# Patient Record
Sex: FEMALE | Race: BLACK OR AFRICAN AMERICAN | NOT HISPANIC OR LATINO | Employment: FULL TIME | URBAN - METROPOLITAN AREA
[De-identification: names, ages, dates, MRNs, and addresses within clinical notes are randomized per-mention and may not be internally consistent; named-entity substitution may affect disease eponyms.]

---

## 2020-12-21 ENCOUNTER — APPOINTMENT (OUTPATIENT)
Dept: GENERAL RADIOLOGY | Facility: CLINIC | Age: 45
DRG: 982 | End: 2020-12-21
Attending: EMERGENCY MEDICINE
Payer: COMMERCIAL

## 2020-12-21 ENCOUNTER — APPOINTMENT (OUTPATIENT)
Dept: GENERAL RADIOLOGY | Facility: CLINIC | Age: 45
DRG: 982 | End: 2020-12-21
Payer: COMMERCIAL

## 2020-12-21 ENCOUNTER — COMMUNICATION - HEALTHEAST (OUTPATIENT)
Dept: SCHEDULING | Facility: CLINIC | Age: 45
End: 2020-12-21

## 2020-12-21 ENCOUNTER — APPOINTMENT (OUTPATIENT)
Dept: CARDIOLOGY | Facility: CLINIC | Age: 45
DRG: 982 | End: 2020-12-21
Payer: COMMERCIAL

## 2020-12-21 ENCOUNTER — ANCILLARY PROCEDURE (OUTPATIENT)
Dept: ULTRASOUND IMAGING | Facility: CLINIC | Age: 45
End: 2020-12-21
Attending: EMERGENCY MEDICINE
Payer: COMMERCIAL

## 2020-12-21 ENCOUNTER — HOSPITAL ENCOUNTER (INPATIENT)
Facility: CLINIC | Age: 45
LOS: 14 days | Discharge: HOME HEALTH | DRG: 982 | End: 2021-01-04
Attending: EMERGENCY MEDICINE | Admitting: HOSPITALIST
Payer: COMMERCIAL

## 2020-12-21 DIAGNOSIS — I82.4Y2 ACUTE DEEP VEIN THROMBOSIS (DVT) OF PROXIMAL VEIN OF LEFT LOWER EXTREMITY (H): ICD-10-CM

## 2020-12-21 DIAGNOSIS — I26.99 PULMONARY EMBOLISM, BILATERAL (H): ICD-10-CM

## 2020-12-21 DIAGNOSIS — Z20.828 CONTACT WITH AND (SUSPECTED) EXPOSURE TO OTHER VIRAL COMMUNICABLE DISEASES: ICD-10-CM

## 2020-12-21 DIAGNOSIS — S82.402A TIBIA/FIBULA FRACTURE, LEFT, CLOSED, INITIAL ENCOUNTER: ICD-10-CM

## 2020-12-21 DIAGNOSIS — S82.252A CLOSED DISPLACED COMMINUTED FRACTURE OF SHAFT OF LEFT TIBIA, INITIAL ENCOUNTER: ICD-10-CM
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
## 2020-12-21 DIAGNOSIS — S82.202A TIBIA/FIBULA FRACTURE, LEFT, CLOSED, INITIAL ENCOUNTER: ICD-10-CM

## 2020-12-21 DIAGNOSIS — S82.452A CLOSED DISPLACED COMMINUTED FRACTURE OF SHAFT OF LEFT FIBULA, INITIAL ENCOUNTER: ICD-10-CM

## 2020-12-21 LAB
ABO + RH BLD: NORMAL
ABO + RH BLD: NORMAL
ALBUMIN SERPL-MCNC: 3.4 G/DL (ref 3.4–5)
ALP SERPL-CCNC: 63 U/L (ref 40–150)
ALT SERPL W P-5'-P-CCNC: 34 U/L (ref 0–50)
ANION GAP SERPL CALCULATED.3IONS-SCNC: 6 MMOL/L (ref 3–14)
APTT PPP: 112 SEC (ref 22–37)
APTT PPP: 115 SEC (ref 22–37)
AST SERPL W P-5'-P-CCNC: 58 U/L (ref 0–45)
BASOPHILS # BLD AUTO: 0.1 10E9/L (ref 0–0.2)
BASOPHILS NFR BLD AUTO: 0.7 %
BILIRUB SERPL-MCNC: 0.5 MG/DL (ref 0.2–1.3)
BLD GP AB SCN SERPL QL: NORMAL
BLOOD BANK CMNT PATIENT-IMP: NORMAL
BUN SERPL-MCNC: 11 MG/DL (ref 7–30)
CALCIUM SERPL-MCNC: 8.7 MG/DL (ref 8.5–10.1)
CHLORIDE SERPL-SCNC: 110 MMOL/L (ref 94–109)
CO2 SERPL-SCNC: 24 MMOL/L (ref 20–32)
CREAT SERPL-MCNC: 0.92 MG/DL (ref 0.52–1.04)
DIFFERENTIAL METHOD BLD: ABNORMAL
EOSINOPHIL # BLD AUTO: 0.2 10E9/L (ref 0–0.7)
EOSINOPHIL NFR BLD AUTO: 2.7 %
ERYTHROCYTE [DISTWIDTH] IN BLOOD BY AUTOMATED COUNT: 13.9 % (ref 10–15)
ERYTHROCYTE [DISTWIDTH] IN BLOOD BY AUTOMATED COUNT: 14.1 % (ref 10–15)
FLUAV+FLUBV RNA SPEC QL NAA+PROBE: NEGATIVE
FLUAV+FLUBV RNA SPEC QL NAA+PROBE: NEGATIVE
GFR SERPL CREATININE-BSD FRML MDRD: 74 ML/MIN/{1.73_M2}
GLUCOSE BLDC GLUCOMTR-MCNC: 93 MG/DL (ref 70–99)
GLUCOSE SERPL-MCNC: 99 MG/DL (ref 70–99)
HCG SERPL QL: NEGATIVE
HCT VFR BLD AUTO: 36.4 % (ref 35–47)
HCT VFR BLD AUTO: 38.3 % (ref 35–47)
HGB BLD-MCNC: 11.9 G/DL (ref 11.7–15.7)
HGB BLD-MCNC: 12.6 G/DL (ref 11.7–15.7)
IMM GRANULOCYTES # BLD: 0.1 10E9/L (ref 0–0.4)
IMM GRANULOCYTES NFR BLD: 1 %
INR PPP: 1.2 (ref 0.86–1.14)
INTERPRETATION ECG - MUSE: NORMAL
LABORATORY COMMENT REPORT: NORMAL
LACTATE BLD-SCNC: 1 MMOL/L (ref 0.7–2)
LIPASE SERPL-CCNC: 116 U/L (ref 73–393)
LYMPHOCYTES # BLD AUTO: 1.8 10E9/L (ref 0.8–5.3)
LYMPHOCYTES NFR BLD AUTO: 25.2 %
MCH RBC QN AUTO: 27.4 PG (ref 26.5–33)
MCH RBC QN AUTO: 27.7 PG (ref 26.5–33)
MCHC RBC AUTO-ENTMCNC: 32.7 G/DL (ref 31.5–36.5)
MCHC RBC AUTO-ENTMCNC: 32.9 G/DL (ref 31.5–36.5)
MCV RBC AUTO: 84 FL (ref 78–100)
MCV RBC AUTO: 84 FL (ref 78–100)
MONOCYTES # BLD AUTO: 0.7 10E9/L (ref 0–1.3)
MONOCYTES NFR BLD AUTO: 10.3 %
NEUTROPHILS # BLD AUTO: 4.2 10E9/L (ref 1.6–8.3)
NEUTROPHILS NFR BLD AUTO: 60.1 %
NRBC # BLD AUTO: 0 10*3/UL
NRBC BLD AUTO-RTO: 0 /100
NT-PROBNP SERPL-MCNC: 754 PG/ML (ref 0–450)
PLATELET # BLD AUTO: 138 10E9/L (ref 150–450)
PLATELET # BLD AUTO: 144 10E9/L (ref 150–450)
POTASSIUM SERPL-SCNC: 3.5 MMOL/L (ref 3.4–5.3)
PROT SERPL-MCNC: 7.5 G/DL (ref 6.8–8.8)
RBC # BLD AUTO: 4.35 10E12/L (ref 3.8–5.2)
RBC # BLD AUTO: 4.55 10E12/L (ref 3.8–5.2)
RSV RNA SPEC QL NAA+PROBE: NEGATIVE
SARS-COV-2 RNA SPEC QL NAA+PROBE: NEGATIVE
SODIUM SERPL-SCNC: 140 MMOL/L (ref 133–144)
SPECIMEN EXP DATE BLD: NORMAL
SPECIMEN SOURCE: NORMAL
TROPONIN I SERPL-MCNC: 0.27 UG/L (ref 0–0.04)
UFH PPP CHRO-ACNC: 0.77 IU/ML
UFH PPP CHRO-ACNC: 0.98 IU/ML
WBC # BLD AUTO: 6.2 10E9/L (ref 4–11)
WBC # BLD AUTO: 7 10E9/L (ref 4–11)

## 2020-12-21 PROCEDURE — 93308 TTE F-UP OR LMTD: CPT | Performed by: EMERGENCY MEDICINE

## 2020-12-21 PROCEDURE — 86900 BLOOD TYPING SEROLOGIC ABO: CPT | Performed by: EMERGENCY MEDICINE

## 2020-12-21 PROCEDURE — 250N000013 HC RX MED GY IP 250 OP 250 PS 637: Performed by: STUDENT IN AN ORGANIZED HEALTH CARE EDUCATION/TRAINING PROGRAM

## 2020-12-21 PROCEDURE — 84484 ASSAY OF TROPONIN QUANT: CPT | Performed by: EMERGENCY MEDICINE

## 2020-12-21 PROCEDURE — 80053 COMPREHEN METABOLIC PANEL: CPT | Performed by: EMERGENCY MEDICINE

## 2020-12-21 PROCEDURE — 120N000003 HC R&B IMCU UMMC

## 2020-12-21 PROCEDURE — 85610 PROTHROMBIN TIME: CPT | Performed by: EMERGENCY MEDICINE

## 2020-12-21 PROCEDURE — 96365 THER/PROPH/DIAG IV INF INIT: CPT | Performed by: EMERGENCY MEDICINE

## 2020-12-21 PROCEDURE — 250N000011 HC RX IP 250 OP 636: Performed by: EMERGENCY MEDICINE

## 2020-12-21 PROCEDURE — 93308 TTE F-UP OR LMTD: CPT | Mod: 26 | Performed by: EMERGENCY MEDICINE

## 2020-12-21 PROCEDURE — 85730 THROMBOPLASTIN TIME PARTIAL: CPT | Performed by: EMERGENCY MEDICINE

## 2020-12-21 PROCEDURE — 255N000002 HC RX 255 OP 636: Performed by: INTERNAL MEDICINE

## 2020-12-21 PROCEDURE — 84703 CHORIONIC GONADOTROPIN ASSAY: CPT | Performed by: EMERGENCY MEDICINE

## 2020-12-21 PROCEDURE — G0463 HOSPITAL OUTPT CLINIC VISIT: HCPCS

## 2020-12-21 PROCEDURE — 999N000208 ECHOCARDIOGRAM COMPLETE

## 2020-12-21 PROCEDURE — 999N000065 XR TIBIA & FIBULA PORT LT 2 VW: Mod: LT

## 2020-12-21 PROCEDURE — 73590 X-RAY EXAM OF LOWER LEG: CPT | Mod: LT

## 2020-12-21 PROCEDURE — 250N000011 HC RX IP 250 OP 636: Performed by: STUDENT IN AN ORGANIZED HEALTH CARE EDUCATION/TRAINING PROGRAM

## 2020-12-21 PROCEDURE — 83605 ASSAY OF LACTIC ACID: CPT | Performed by: EMERGENCY MEDICINE

## 2020-12-21 PROCEDURE — 36415 COLL VENOUS BLD VENIPUNCTURE: CPT | Performed by: HOSPITALIST

## 2020-12-21 PROCEDURE — 85730 THROMBOPLASTIN TIME PARTIAL: CPT | Performed by: STUDENT IN AN ORGANIZED HEALTH CARE EDUCATION/TRAINING PROGRAM

## 2020-12-21 PROCEDURE — 73590 X-RAY EXAM OF LOWER LEG: CPT | Mod: 26 | Performed by: RADIOLOGY

## 2020-12-21 PROCEDURE — 73610 X-RAY EXAM OF ANKLE: CPT | Mod: 26 | Performed by: RADIOLOGY

## 2020-12-21 PROCEDURE — 99233 SBSQ HOSP IP/OBS HIGH 50: CPT | Mod: GC | Performed by: STUDENT IN AN ORGANIZED HEALTH CARE EDUCATION/TRAINING PROGRAM

## 2020-12-21 PROCEDURE — 87636 SARSCOV2 & INF A&B AMP PRB: CPT | Performed by: EMERGENCY MEDICINE

## 2020-12-21 PROCEDURE — 85520 HEPARIN ASSAY: CPT | Performed by: HOSPITALIST

## 2020-12-21 PROCEDURE — 96366 THER/PROPH/DIAG IV INF ADDON: CPT | Performed by: EMERGENCY MEDICINE

## 2020-12-21 PROCEDURE — 99285 EMERGENCY DEPT VISIT HI MDM: CPT | Mod: 25 | Performed by: EMERGENCY MEDICINE

## 2020-12-21 PROCEDURE — 85025 COMPLETE CBC W/AUTO DIFF WBC: CPT | Performed by: EMERGENCY MEDICINE

## 2020-12-21 PROCEDURE — 85027 COMPLETE CBC AUTOMATED: CPT | Performed by: STUDENT IN AN ORGANIZED HEALTH CARE EDUCATION/TRAINING PROGRAM

## 2020-12-21 PROCEDURE — 86850 RBC ANTIBODY SCREEN: CPT | Performed by: EMERGENCY MEDICINE

## 2020-12-21 PROCEDURE — 99291 CRITICAL CARE FIRST HOUR: CPT | Mod: 25 | Performed by: EMERGENCY MEDICINE

## 2020-12-21 PROCEDURE — 93010 ELECTROCARDIOGRAM REPORT: CPT | Performed by: EMERGENCY MEDICINE

## 2020-12-21 PROCEDURE — 93005 ELECTROCARDIOGRAM TRACING: CPT | Performed by: EMERGENCY MEDICINE

## 2020-12-21 PROCEDURE — 73560 X-RAY EXAM OF KNEE 1 OR 2: CPT | Mod: 26 | Performed by: RADIOLOGY

## 2020-12-21 PROCEDURE — 83880 ASSAY OF NATRIURETIC PEPTIDE: CPT | Performed by: EMERGENCY MEDICINE

## 2020-12-21 PROCEDURE — 83690 ASSAY OF LIPASE: CPT | Performed by: EMERGENCY MEDICINE

## 2020-12-21 PROCEDURE — 86901 BLOOD TYPING SEROLOGIC RH(D): CPT | Performed by: EMERGENCY MEDICINE

## 2020-12-21 PROCEDURE — 85520 HEPARIN ASSAY: CPT | Performed by: NURSE PRACTITIONER

## 2020-12-21 PROCEDURE — 93306 TTE W/DOPPLER COMPLETE: CPT | Mod: 26 | Performed by: INTERNAL MEDICINE

## 2020-12-21 PROCEDURE — 36415 COLL VENOUS BLD VENIPUNCTURE: CPT | Performed by: NURSE PRACTITIONER

## 2020-12-21 PROCEDURE — C9803 HOPD COVID-19 SPEC COLLECT: HCPCS | Performed by: EMERGENCY MEDICINE

## 2020-12-21 PROCEDURE — 999N001017 HC STATISTIC GLUCOSE BY METER IP

## 2020-12-21 PROCEDURE — 36415 COLL VENOUS BLD VENIPUNCTURE: CPT | Performed by: STUDENT IN AN ORGANIZED HEALTH CARE EDUCATION/TRAINING PROGRAM

## 2020-12-21 PROCEDURE — 73610 X-RAY EXAM OF ANKLE: CPT | Mod: LT

## 2020-12-21 PROCEDURE — 73560 X-RAY EXAM OF KNEE 1 OR 2: CPT | Mod: LT

## 2020-12-21 RX ORDER — NALOXONE HYDROCHLORIDE 0.4 MG/ML
0.2 INJECTION, SOLUTION INTRAMUSCULAR; INTRAVENOUS; SUBCUTANEOUS
Status: DISCONTINUED | OUTPATIENT
Start: 2020-12-21 | End: 2021-01-04 | Stop reason: HOSPADM

## 2020-12-21 RX ORDER — PHENOL 1.4 %
600 AEROSOL, SPRAY (ML) MUCOUS MEMBRANE DAILY
COMMUNITY

## 2020-12-21 RX ORDER — OXYCODONE HYDROCHLORIDE 5 MG/1
5 TABLET ORAL EVERY 6 HOURS PRN
Status: DISCONTINUED | OUTPATIENT
Start: 2020-12-21 | End: 2020-12-31

## 2020-12-21 RX ORDER — ACETAMINOPHEN 325 MG/1
975 TABLET ORAL 3 TIMES DAILY
Status: DISCONTINUED | OUTPATIENT
Start: 2020-12-21 | End: 2021-01-04 | Stop reason: HOSPADM

## 2020-12-21 RX ORDER — NALOXONE HYDROCHLORIDE 0.4 MG/ML
0.4 INJECTION, SOLUTION INTRAMUSCULAR; INTRAVENOUS; SUBCUTANEOUS
Status: DISCONTINUED | OUTPATIENT
Start: 2020-12-21 | End: 2021-01-04 | Stop reason: HOSPADM

## 2020-12-21 RX ORDER — ASPIRIN 81 MG
1 TABLET, DELAYED RELEASE (ENTERIC COATED) ORAL DAILY
COMMUNITY

## 2020-12-21 RX ORDER — ACETAMINOPHEN 500 MG
500 TABLET ORAL EVERY 6 HOURS PRN
Status: DISCONTINUED | OUTPATIENT
Start: 2020-12-21 | End: 2020-12-21

## 2020-12-21 RX ORDER — CETIRIZINE HYDROCHLORIDE 10 MG/1
10 TABLET ORAL DAILY
COMMUNITY

## 2020-12-21 RX ORDER — HYDROMORPHONE HYDROCHLORIDE 1 MG/ML
0.3 INJECTION, SOLUTION INTRAMUSCULAR; INTRAVENOUS; SUBCUTANEOUS
Status: DISCONTINUED | OUTPATIENT
Start: 2020-12-21 | End: 2021-01-03

## 2020-12-21 RX ORDER — HEPARIN SODIUM 10000 [USP'U]/100ML
0-5000 INJECTION, SOLUTION INTRAVENOUS CONTINUOUS
Status: DISCONTINUED | OUTPATIENT
Start: 2020-12-21 | End: 2020-12-21

## 2020-12-21 RX ORDER — HEPARIN SODIUM 10000 [USP'U]/100ML
0-5000 INJECTION, SOLUTION INTRAVENOUS CONTINUOUS
Status: DISCONTINUED | OUTPATIENT
Start: 2020-12-21 | End: 2020-12-23

## 2020-12-21 RX ADMIN — HYDROMORPHONE HYDROCHLORIDE 0.3 MG: 1 INJECTION, SOLUTION INTRAMUSCULAR; INTRAVENOUS; SUBCUTANEOUS at 16:15

## 2020-12-21 RX ADMIN — HYDROMORPHONE HYDROCHLORIDE 0.3 MG: 1 INJECTION, SOLUTION INTRAMUSCULAR; INTRAVENOUS; SUBCUTANEOUS at 10:22

## 2020-12-21 RX ADMIN — HUMAN ALBUMIN MICROSPHERES AND PERFLUTREN 6 ML: 10; .22 INJECTION, SOLUTION INTRAVENOUS at 10:00

## 2020-12-21 RX ADMIN — ACETAMINOPHEN 500 MG: 500 TABLET, FILM COATED ORAL at 08:25

## 2020-12-21 RX ADMIN — HEPARIN SODIUM 1200 UNITS/HR: 10000 INJECTION, SOLUTION INTRAVENOUS at 03:08

## 2020-12-21 RX ADMIN — OXYCODONE HYDROCHLORIDE 5 MG: 5 TABLET ORAL at 09:58

## 2020-12-21 RX ADMIN — Medication 1 MG: at 21:51

## 2020-12-21 RX ADMIN — HEPARIN SODIUM 1200 UNITS/HR: 10000 INJECTION, SOLUTION INTRAVENOUS at 10:22

## 2020-12-21 ASSESSMENT — MIFFLIN-ST. JEOR
SCORE: 1918.63
SCORE: 1811.62

## 2020-12-21 ASSESSMENT — ACTIVITIES OF DAILY LIVING (ADL)
ADLS_ACUITY_SCORE: 18
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
## 2020-12-21 NOTE — PLAN OF CARE
Transferred to: 6B at 1700.  Belongings: purse, cellphone/, SIM card, wallet, clothing, shoes, and crutches.  Bernal removed? NA  Central line removed? NA  Chart and medications sent with patient Yes  Family notified: No: Pt on phone with family right now, will notify them herself.      A&O x4. SR/ST 's. VSS on RA. Regular diet, tolerating well. Voids per bedpan. LLE cast changed out this am by Orthosurgery - multiple blisters/wounds noted under cast. Scattered bruising throughout. R PIV x 2 - heparin gtt at 1700 (next check at 1915). PRN dilaudid and oxycodone given.
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
## 2020-12-21 NOTE — LETTER
Transition Communication Hand-off for Care Transitions to Next Level of Care Provider    Name: Moni Molina  : 1975  MRN #: [id number]  Primary Care Provider: Physician No Ref-Primary     Primary Clinic: Dr. Vashti Demarco MD is a Family Medicine Specialist in [place], [place] and has over 26 years of experience in the medical field. ... [facility].   Reason for Hospitalization:  Pulmonary embolism, bilateral (H) [I26.99]  Tibia/fibula fracture, left, closed, initial encounter [S82.202A, S82.402A]  Acute deep vein thrombosis (DVT) of proximal vein of left lower extremity (H) [I82.4Y2]  Admit Date/Time: 2020  2:28 AM  Discharge Date: 2021  Payor Source: Payor: TPACK / Plan: [employer] [place] / Product Type: HMO /   Discharge Plan: Home with  Home care.         Discharge Needs Assessment:  Needs      Most Recent Value   Equipment Currently Used at Home  grab bar, tub/shower, shower chair        Future Appointments   Date Time Provider Department Center   2021  2:30 PM Tasia Velez PT [facility] O     Referrals     Future Labs/Procedures    Home care nursing referral     Comments:    Please fax discharge orders to [facility] [place]    Ph:  [phone]    Fax: [phone]    Skilled home care RN for initial home safety evaluation and 1-3 times a week to evaluate medication management, nutrition and hydration evaluation, endurance evaluation, and general status evaluation after discharge from the acute care hospital setting.    Skilled home care Physical Therapist and Occupational Therapist to evaluate and treat in home setting per recommendations of the inpatient Rehabilitation Consult Team including recommendations for equipment that will help patient in her home setting.          Joanne Atwood, RN
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
## 2020-12-21 NOTE — PROGRESS NOTES
Transfer  Transferred from: 4A    Via: bed  Reason for transfer: Pt appropriate for 6B- improving/stable patient condition  Family: Aware of transfer  Belongings: Received with pt  Chart: Received with pt  Medications: Meds received from old unit with pt  Code Status verified on armband: yes  2 RN Skin Assessment Completed By: Miller COSTA & Kristine CASTANON  Med rec completed: yes  Bed surface reassessed with algorithm and charted: yes  New bed surface ordered: no    Report received from: Dayna BELTRAN RN  Pt status: Stable at this time.

## 2020-12-21 NOTE — LETTER
[facility] UNIT [address]B [address] [address]  [phone]    2020    Re: Moni Molina  [address] [place] [address] E  [place] [address]  [phone] (home)     : 1975      To Whom It May Concern:      Moni Molina was hospitalized on 2020 and due to medical illness and injury. She will need surgery next week, and is unable to work until deemed stable after the surgery. Further work restriction will be determined by surgeon after next week's surgery.        Sincerely,        Monik Adam MD

## 2020-12-21 NOTE — ED NOTES
[facility]    ED Nurse to Floor Handoff     Moni Molina is a 45 year old female who speaks English and lives with a spouse,  in a home  They arrived in the ED by ambulance from emergency room    ED Chief Complaint: Leg Injury    ED Dx;   Final diagnoses:   Pulmonary embolism, bilateral (H)   Tibia/fibula fracture, left, closed, initial encounter   Acute deep vein thrombosis (DVT) of proximal vein of left lower extremity (H)         Needed?: No    Allergies: No Known Allergies.  Past Medical Hx: History reviewed. No pertinent past medical history.   Baseline Mental status: WDL  Current Mental Status changes: at basesline    Infection present or suspected this encounter: no  Sepsis suspected: No  Isolation type: Special Precautions-COVID-19  Patient tested for COVID 19 prior to admission: YES     Activity level - Baseline/Home:  Independent  Activity Level - Current:   Stand with Assist    Bariatric equipment needed?: No    In the ED these meds were given:   Medications   heparin 25,000 units in 0.45% NaCl 250 mL ANTICOAGULANT  infusion (1,200 Units/hr Intravenous New Bag 12/21/20 0308)       Drips running?  Yes    Home pump  No    Current LDAs  Peripheral IV 12/21/20 Right Upper forearm (Active)   Number of days: 0       Peripheral IV 12/21/20 Right Lower forearm (Active)   Site Assessment WDL 12/21/20 0319   Line Status Infusing 12/21/20 0319   Phlebitis Scale 0-->no symptoms 12/21/20 0319   Number of days: 0       Labs results:   Labs Ordered and Resulted from Time of ED Arrival Up to the Time of Departure from the ED   CBC WITH PLATELETS DIFFERENTIAL - Abnormal; Notable for the following components:       Result Value    Platelet Count 138 (*)     All other components within normal limits   INR - Abnormal; Notable for the following components:    INR 1.20 (*)     All other components within normal limits   PARTIAL THROMBOPLASTIN TIME - Abnormal; Notable  "for the following components:     (*)     All other components within normal limits   COMPREHENSIVE METABOLIC PANEL - Abnormal; Notable for the following components:    Chloride 110 (*)     AST 58 (*)     All other components within normal limits   NT PROBNP INPATIENT - Abnormal; Notable for the following components:    N-Terminal Pro BNP Inpatient 754 (*)     All other components within normal limits   TROPONIN I - Abnormal; Notable for the following components:    Troponin I ES 0.274 (*)     All other components within normal limits   LIPASE   LACTIC ACID WHOLE BLOOD   INFLUENZA A/B & SARS-COV2 PCR MULTIPLEX   MEASURE WEIGHT   NOTIFY PHYSICIAN   NOTIFY PHYSICIAN   ABO/RH TYPE AND SCREEN       Imaging Studies:   Recent Results (from the past 24 hour(s))   POC US Echo Limited    Impression    Limited Bedside Cardiac Ultrasound, performed and interpreted by me.   Indication: Shortness of Breath.  Parasternal long axis, parasternal short axis and subcostal views were acquired.   Image quality poor    Findings:    Small pericardial effusion.  Ventricle sizes appear to be equal however poor windows overall.  Difficulty with assessing the IVC.    IMPRESSION: Small pericardial effusion.  Ventricle sizes appear to be equal however poor windows overall.  Difficulty with assessing the IVC.       Recent vital signs:   BP (!) 150/88   Pulse 97   Temp 98.8  F (37.1  C) (Oral)   Resp 22   Ht 1.702 m (5' 7\")   Wt 113.4 kg (250 lb)   SpO2 93%   Breastfeeding No   BMI 39.16 kg/m      [place] Coma Scale Score: 15 (12/21/20 0247)       Cardiac Rhythm: Normal Sinus  Pt needs tele? TBD upon floor admission  Skin/wound Issues: None    Code Status: to be discussed upon admission     Pain control: good    Nausea control: good    Abnormal labs/tests/findings requiring intervention: see epic- elevated trop    Family present during ED course? No   Family Comments/Social Situation comments: n/a    Tasks needing completion: " None    Dayna Olivera RN    [phone]
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
## 2020-12-21 NOTE — PLAN OF CARE
Admitted/transferred from: ED  Reason for admission/transfer: Admission   2 RN skin assessment: completed by Dayna Godwin RN  Result of skin assessment and interventions/actions: LLE splint  Height, weight, drug calc weight: Done  Patient belongings (see Flowsheet)  MDRO education added to care planN/A  ?
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
## 2020-12-21 NOTE — LETTER
[facility] [address] [address]  [phone]    2020    Re: Moni Molina  [address] [place] [address]  [phone] (home)     : 1975      To Whom It May Concern:      Moni Molina was hospitalized on 2020 due to medical illness and injury. She will need surgery next week, and is unable to return to work until deemed stable after the surgery. Further work restriction will be determined by surgeon after next weeks' surgery.        Sincerely,        Darrell Franklin MD

## 2020-12-22 ENCOUNTER — PREP FOR PROCEDURE (OUTPATIENT)
Dept: ORTHOPEDICS | Facility: CLINIC | Age: 45
End: 2020-12-22

## 2020-12-22 DIAGNOSIS — S82.202A TIBIA/FIBULA FRACTURE, LEFT, CLOSED, INITIAL ENCOUNTER: Primary | ICD-10-CM

## 2020-12-22 DIAGNOSIS — S82.402A TIBIA/FIBULA FRACTURE, LEFT, CLOSED, INITIAL ENCOUNTER: Primary | ICD-10-CM

## 2020-12-22 LAB
ALBUMIN SERPL-MCNC: 3.4 G/DL (ref 3.4–5)
ALP SERPL-CCNC: 62 U/L (ref 40–150)
ALT SERPL W P-5'-P-CCNC: 36 U/L (ref 0–50)
ANION GAP SERPL CALCULATED.3IONS-SCNC: 10 MMOL/L (ref 3–14)
AST SERPL W P-5'-P-CCNC: 46 U/L (ref 0–45)
B-HCG SERPL-ACNC: <1 IU/L (ref 0–5)
BASOPHILS # BLD AUTO: 0 10E9/L (ref 0–0.2)
BASOPHILS NFR BLD AUTO: 0.5 %
BILIRUB SERPL-MCNC: 0.7 MG/DL (ref 0.2–1.3)
BUN SERPL-MCNC: 13 MG/DL (ref 7–30)
CALCIUM SERPL-MCNC: 9.1 MG/DL (ref 8.5–10.1)
CHLORIDE SERPL-SCNC: 104 MMOL/L (ref 94–109)
CO2 SERPL-SCNC: 23 MMOL/L (ref 20–32)
CREAT SERPL-MCNC: 0.92 MG/DL (ref 0.52–1.04)
DIFFERENTIAL METHOD BLD: NORMAL
EOSINOPHIL # BLD AUTO: 0.3 10E9/L (ref 0–0.7)
EOSINOPHIL NFR BLD AUTO: 4 %
ERYTHROCYTE [DISTWIDTH] IN BLOOD BY AUTOMATED COUNT: 14.2 % (ref 10–15)
GFR SERPL CREATININE-BSD FRML MDRD: 74 ML/MIN/{1.73_M2}
GLUCOSE SERPL-MCNC: 73 MG/DL (ref 70–99)
HCT VFR BLD AUTO: 36.9 % (ref 35–47)
HGB BLD-MCNC: 11.8 G/DL (ref 11.7–15.7)
IMM GRANULOCYTES # BLD: 0.1 10E9/L (ref 0–0.4)
IMM GRANULOCYTES NFR BLD: 1.3 %
LYMPHOCYTES # BLD AUTO: 2.1 10E9/L (ref 0.8–5.3)
LYMPHOCYTES NFR BLD AUTO: 26.6 %
MCH RBC QN AUTO: 27.2 PG (ref 26.5–33)
MCHC RBC AUTO-ENTMCNC: 32 G/DL (ref 31.5–36.5)
MCV RBC AUTO: 85 FL (ref 78–100)
MONOCYTES # BLD AUTO: 0.7 10E9/L (ref 0–1.3)
MONOCYTES NFR BLD AUTO: 9.5 %
NEUTROPHILS # BLD AUTO: 4.5 10E9/L (ref 1.6–8.3)
NEUTROPHILS NFR BLD AUTO: 58.1 %
NRBC # BLD AUTO: 0 10*3/UL
NRBC BLD AUTO-RTO: 0 /100
PLATELET # BLD AUTO: 153 10E9/L (ref 150–450)
POTASSIUM SERPL-SCNC: 3.6 MMOL/L (ref 3.4–5.3)
PROT SERPL-MCNC: 7.6 G/DL (ref 6.8–8.8)
RBC # BLD AUTO: 4.34 10E12/L (ref 3.8–5.2)
SODIUM SERPL-SCNC: 137 MMOL/L (ref 133–144)
TROPONIN I SERPL-MCNC: 0.04 UG/L (ref 0–0.04)
UFH PPP CHRO-ACNC: 0.66 IU/ML
UFH PPP CHRO-ACNC: >1.1 IU/ML
UFH PPP CHRO-ACNC: NORMAL IU/ML
WBC # BLD AUTO: 7.8 10E9/L (ref 4–11)

## 2020-12-22 PROCEDURE — 250N000013 HC RX MED GY IP 250 OP 250 PS 637: Performed by: STUDENT IN AN ORGANIZED HEALTH CARE EDUCATION/TRAINING PROGRAM

## 2020-12-22 PROCEDURE — 84702 CHORIONIC GONADOTROPIN TEST: CPT | Performed by: STUDENT IN AN ORGANIZED HEALTH CARE EDUCATION/TRAINING PROGRAM

## 2020-12-22 PROCEDURE — 80053 COMPREHEN METABOLIC PANEL: CPT | Performed by: STUDENT IN AN ORGANIZED HEALTH CARE EDUCATION/TRAINING PROGRAM

## 2020-12-22 PROCEDURE — 250N000011 HC RX IP 250 OP 636: Performed by: STUDENT IN AN ORGANIZED HEALTH CARE EDUCATION/TRAINING PROGRAM

## 2020-12-22 PROCEDURE — 99232 SBSQ HOSP IP/OBS MODERATE 35: CPT | Performed by: PHYSICIAN ASSISTANT

## 2020-12-22 PROCEDURE — 84484 ASSAY OF TROPONIN QUANT: CPT | Performed by: STUDENT IN AN ORGANIZED HEALTH CARE EDUCATION/TRAINING PROGRAM

## 2020-12-22 PROCEDURE — 99223 1ST HOSP IP/OBS HIGH 75: CPT | Mod: GC | Performed by: INTERNAL MEDICINE

## 2020-12-22 PROCEDURE — 99233 SBSQ HOSP IP/OBS HIGH 50: CPT | Mod: GC | Performed by: INTERNAL MEDICINE

## 2020-12-22 PROCEDURE — 120N000003 HC R&B IMCU UMMC

## 2020-12-22 PROCEDURE — 85520 HEPARIN ASSAY: CPT | Performed by: NURSE PRACTITIONER

## 2020-12-22 PROCEDURE — 36415 COLL VENOUS BLD VENIPUNCTURE: CPT | Performed by: STUDENT IN AN ORGANIZED HEALTH CARE EDUCATION/TRAINING PROGRAM

## 2020-12-22 PROCEDURE — 85025 COMPLETE CBC W/AUTO DIFF WBC: CPT | Performed by: STUDENT IN AN ORGANIZED HEALTH CARE EDUCATION/TRAINING PROGRAM

## 2020-12-22 PROCEDURE — 85520 HEPARIN ASSAY: CPT | Performed by: STUDENT IN AN ORGANIZED HEALTH CARE EDUCATION/TRAINING PROGRAM

## 2020-12-22 PROCEDURE — 36415 COLL VENOUS BLD VENIPUNCTURE: CPT | Performed by: NURSE PRACTITIONER

## 2020-12-22 RX ORDER — AMOXICILLIN 250 MG
2 CAPSULE ORAL
Status: DISCONTINUED | OUTPATIENT
Start: 2020-12-22 | End: 2020-12-23

## 2020-12-22 RX ORDER — POLYETHYLENE GLYCOL 3350 17 G/17G
17 POWDER, FOR SOLUTION ORAL DAILY
Status: DISCONTINUED | OUTPATIENT
Start: 2020-12-22 | End: 2021-01-04 | Stop reason: HOSPADM

## 2020-12-22 RX ORDER — CARBOXYMETHYLCELLULOSE SODIUM 5 MG/ML
1 SOLUTION/ DROPS OPHTHALMIC 3 TIMES DAILY PRN
Status: DISCONTINUED | OUTPATIENT
Start: 2020-12-22 | End: 2021-01-04 | Stop reason: HOSPADM

## 2020-12-22 RX ORDER — CETIRIZINE HYDROCHLORIDE 10 MG/1
10 TABLET ORAL DAILY
Status: DISCONTINUED | OUTPATIENT
Start: 2020-12-23 | End: 2021-01-04 | Stop reason: HOSPADM

## 2020-12-22 RX ADMIN — POLYETHYLENE GLYCOL 3350 17 G: 17 POWDER, FOR SOLUTION ORAL at 14:05

## 2020-12-22 RX ADMIN — OXYCODONE HYDROCHLORIDE 5 MG: 5 TABLET ORAL at 00:37

## 2020-12-22 RX ADMIN — HYDROMORPHONE HYDROCHLORIDE 0.3 MG: 1 INJECTION, SOLUTION INTRAMUSCULAR; INTRAVENOUS; SUBCUTANEOUS at 17:50

## 2020-12-22 RX ADMIN — CETIRIZINE HYDROCHLORIDE 10 MG: 5 TABLET ORAL at 23:59

## 2020-12-22 RX ADMIN — HEPARIN SODIUM 1500 UNITS/HR: 10000 INJECTION, SOLUTION INTRAVENOUS at 04:16

## 2020-12-22 RX ADMIN — ACETAMINOPHEN 975 MG: 325 TABLET, FILM COATED ORAL at 14:04

## 2020-12-22 RX ADMIN — ACETAMINOPHEN 975 MG: 325 TABLET, FILM COATED ORAL at 22:18

## 2020-12-22 RX ADMIN — OXYCODONE HYDROCHLORIDE 5 MG: 5 TABLET ORAL at 18:57

## 2020-12-22 RX ADMIN — Medication 1 MG: at 22:18

## 2020-12-22 RX ADMIN — ACETAMINOPHEN 975 MG: 325 TABLET, FILM COATED ORAL at 08:29

## 2020-12-22 RX ADMIN — ACETAMINOPHEN 975 MG: 325 TABLET, FILM COATED ORAL at 00:36

## 2020-12-22 RX ADMIN — Medication 1 DROP: at 17:14

## 2020-12-22 ASSESSMENT — ACTIVITIES OF DAILY LIVING (ADL)
ADLS_ACUITY_SCORE: 18
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
## 2020-12-22 NOTE — PROGRESS NOTES
"SPIRITUAL HEALTH SERVICES  SPIRITUAL ASSESSMENT Progress Note  [facility] ([facility]) 6B       REFERRAL SOURCE: Self initiated  visit, Confucianist specific.     Pt Moni Molina identifies as Confucianist and is of [place] descent.     She mentioned to me that, \"I have lost both my parents, and I am the eldest of their children. Thank God that nothing (death) happened to me\".     Nasir has 4 children and takes on much responsibility (in  culture) being that she is the eldest with no parents and hopes to plan trips in the future to [facility] to fulfil some of these responsibilities. She was open with me about this and her feelings/emotions.     I offered Islamic incantations/prayer at bedside.     I also provided them with an Islamic prayer booklet and card with a Prophetic supplication. She received an English translation of the Holy Quran.      PLAN: I will follow up with Moni Molina for the duration of his stay.    Susy Dickinson  Lead Confucianist   Pager [phone]    [facility] remains available 24/7 for emergent requests/referrals, either by having the switchboard page the on-call  or by entering an ASAP/STAT consult in Epic (this will also page the on-call ).    "
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
## 2020-12-22 NOTE — PROVIDER NOTIFICATION
"\"MEHRAN Molina rm [id number] 6B [facility] 4. Pt requesting an order for her PTA refresh eye drops, states she takes them TID PRN in both eyes. Thanks, Davonte [phone]\"        Paged Olivia intern Dr. INO Valenzuela via [employer] at [phone].     "
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
## 2020-12-23 ENCOUNTER — APPOINTMENT (OUTPATIENT)
Dept: INTERVENTIONAL RADIOLOGY/VASCULAR | Facility: CLINIC | Age: 45
DRG: 982 | End: 2020-12-23
Attending: NURSE PRACTITIONER
Payer: COMMERCIAL

## 2020-12-23 LAB
ANION GAP SERPL CALCULATED.3IONS-SCNC: 5 MMOL/L (ref 3–14)
BUN SERPL-MCNC: 13 MG/DL (ref 7–30)
CALCIUM SERPL-MCNC: 9 MG/DL (ref 8.5–10.1)
CHLORIDE SERPL-SCNC: 105 MMOL/L (ref 94–109)
CO2 SERPL-SCNC: 25 MMOL/L (ref 20–32)
CREAT SERPL-MCNC: 0.91 MG/DL (ref 0.52–1.04)
ERYTHROCYTE [DISTWIDTH] IN BLOOD BY AUTOMATED COUNT: 14.1 % (ref 10–15)
GFR SERPL CREATININE-BSD FRML MDRD: 76 ML/MIN/{1.73_M2}
GLUCOSE SERPL-MCNC: 83 MG/DL (ref 70–99)
HCT VFR BLD AUTO: 33.9 % (ref 35–47)
HGB BLD-MCNC: 10.8 G/DL (ref 11.7–15.7)
LACTATE BLD-SCNC: 0.9 MMOL/L (ref 0.7–2)
MCH RBC QN AUTO: 27 PG (ref 26.5–33)
MCHC RBC AUTO-ENTMCNC: 31.9 G/DL (ref 31.5–36.5)
MCV RBC AUTO: 85 FL (ref 78–100)
PLATELET # BLD AUTO: 159 10E9/L (ref 150–450)
POTASSIUM SERPL-SCNC: 3.8 MMOL/L (ref 3.4–5.3)
RBC # BLD AUTO: 4 10E12/L (ref 3.8–5.2)
SODIUM SERPL-SCNC: 135 MMOL/L (ref 133–144)
UFH PPP CHRO-ACNC: 0.6 IU/ML
UFH PPP CHRO-ACNC: 0.68 IU/ML
UFH PPP CHRO-ACNC: 0.83 IU/ML
WBC # BLD AUTO: 7.5 10E9/L (ref 4–11)

## 2020-12-23 PROCEDURE — 99233 SBSQ HOSP IP/OBS HIGH 50: CPT | Mod: GC | Performed by: INTERNAL MEDICINE

## 2020-12-23 PROCEDURE — 36415 COLL VENOUS BLD VENIPUNCTURE: CPT | Performed by: NURSE PRACTITIONER

## 2020-12-23 PROCEDURE — 36415 COLL VENOUS BLD VENIPUNCTURE: CPT | Performed by: INTERNAL MEDICINE

## 2020-12-23 PROCEDURE — 250N000009 HC RX 250: Performed by: STUDENT IN AN ORGANIZED HEALTH CARE EDUCATION/TRAINING PROGRAM

## 2020-12-23 PROCEDURE — 36415 COLL VENOUS BLD VENIPUNCTURE: CPT | Performed by: STUDENT IN AN ORGANIZED HEALTH CARE EDUCATION/TRAINING PROGRAM

## 2020-12-23 PROCEDURE — 37191 INS ENDOVAS VENA CAVA FILTR: CPT

## 2020-12-23 PROCEDURE — C1887 CATHETER, GUIDING: HCPCS

## 2020-12-23 PROCEDURE — 250N000013 HC RX MED GY IP 250 OP 250 PS 637: Performed by: STUDENT IN AN ORGANIZED HEALTH CARE EDUCATION/TRAINING PROGRAM

## 2020-12-23 PROCEDURE — 272N000504 HC NEEDLE CR4

## 2020-12-23 PROCEDURE — 85027 COMPLETE CBC AUTOMATED: CPT | Performed by: STUDENT IN AN ORGANIZED HEALTH CARE EDUCATION/TRAINING PROGRAM

## 2020-12-23 PROCEDURE — 85520 HEPARIN ASSAY: CPT | Performed by: NURSE PRACTITIONER

## 2020-12-23 PROCEDURE — 99152 MOD SED SAME PHYS/QHP 5/>YRS: CPT

## 2020-12-23 PROCEDURE — 06H03DZ INSERTION OF INTRALUMINAL DEVICE INTO INFERIOR VENA CAVA, PERCUTANEOUS APPROACH: ICD-10-PCS | Performed by: RADIOLOGY

## 2020-12-23 PROCEDURE — 250N000011 HC RX IP 250 OP 636: Performed by: STUDENT IN AN ORGANIZED HEALTH CARE EDUCATION/TRAINING PROGRAM

## 2020-12-23 PROCEDURE — 99152 MOD SED SAME PHYS/QHP 5/>YRS: CPT | Mod: GC | Performed by: RADIOLOGY

## 2020-12-23 PROCEDURE — 83605 ASSAY OF LACTIC ACID: CPT | Performed by: NURSE PRACTITIONER

## 2020-12-23 PROCEDURE — 37191 INS ENDOVAS VENA CAVA FILTR: CPT | Mod: GC | Performed by: RADIOLOGY

## 2020-12-23 PROCEDURE — 80048 BASIC METABOLIC PNL TOTAL CA: CPT | Performed by: STUDENT IN AN ORGANIZED HEALTH CARE EDUCATION/TRAINING PROGRAM

## 2020-12-23 PROCEDURE — 85520 HEPARIN ASSAY: CPT | Performed by: STUDENT IN AN ORGANIZED HEALTH CARE EDUCATION/TRAINING PROGRAM

## 2020-12-23 PROCEDURE — 255N000002 HC RX 255 OP 636: Performed by: RADIOLOGY

## 2020-12-23 PROCEDURE — 85520 HEPARIN ASSAY: CPT | Performed by: INTERNAL MEDICINE

## 2020-12-23 PROCEDURE — 120N000003 HC R&B IMCU UMMC

## 2020-12-23 PROCEDURE — C1769 GUIDE WIRE: HCPCS

## 2020-12-23 PROCEDURE — C1880 VENA CAVA FILTER: HCPCS

## 2020-12-23 RX ORDER — NALOXONE HYDROCHLORIDE 0.4 MG/ML
0.4 INJECTION, SOLUTION INTRAMUSCULAR; INTRAVENOUS; SUBCUTANEOUS
Status: DISCONTINUED | OUTPATIENT
Start: 2020-12-23 | End: 2020-12-23

## 2020-12-23 RX ORDER — IODIXANOL 320 MG/ML
100 INJECTION, SOLUTION INTRAVASCULAR ONCE
Status: COMPLETED | OUTPATIENT
Start: 2020-12-23 | End: 2020-12-23

## 2020-12-23 RX ORDER — FLUMAZENIL 0.1 MG/ML
0.2 INJECTION, SOLUTION INTRAVENOUS
Status: DISCONTINUED | OUTPATIENT
Start: 2020-12-23 | End: 2020-12-23

## 2020-12-23 RX ORDER — AMOXICILLIN 250 MG
2 CAPSULE ORAL 2 TIMES DAILY
Status: DISCONTINUED | OUTPATIENT
Start: 2020-12-23 | End: 2021-01-04 | Stop reason: HOSPADM

## 2020-12-23 RX ORDER — DEXTROSE MONOHYDRATE 25 G/50ML
25-50 INJECTION, SOLUTION INTRAVENOUS
Status: DISCONTINUED | OUTPATIENT
Start: 2020-12-23 | End: 2021-01-04 | Stop reason: HOSPADM

## 2020-12-23 RX ORDER — ACETAMINOPHEN 500 MG
500 TABLET ORAL EVERY 6 HOURS PRN
Status: DISCONTINUED | OUTPATIENT
Start: 2020-12-23 | End: 2020-12-23

## 2020-12-23 RX ORDER — NICOTINE POLACRILEX 4 MG
15-30 LOZENGE BUCCAL
Status: DISCONTINUED | OUTPATIENT
Start: 2020-12-23 | End: 2021-01-04 | Stop reason: HOSPADM

## 2020-12-23 RX ORDER — HEPARIN SODIUM 200 [USP'U]/100ML
1 INJECTION, SOLUTION INTRAVENOUS CONTINUOUS PRN
Status: DISCONTINUED | OUTPATIENT
Start: 2020-12-23 | End: 2020-12-23

## 2020-12-23 RX ORDER — NALOXONE HYDROCHLORIDE 0.4 MG/ML
0.2 INJECTION, SOLUTION INTRAMUSCULAR; INTRAVENOUS; SUBCUTANEOUS
Status: DISCONTINUED | OUTPATIENT
Start: 2020-12-23 | End: 2020-12-23

## 2020-12-23 RX ORDER — FENTANYL CITRATE 50 UG/ML
25-50 INJECTION, SOLUTION INTRAMUSCULAR; INTRAVENOUS EVERY 5 MIN PRN
Status: DISCONTINUED | OUTPATIENT
Start: 2020-12-23 | End: 2020-12-23

## 2020-12-23 RX ADMIN — ACETAMINOPHEN 975 MG: 325 TABLET, FILM COATED ORAL at 15:10

## 2020-12-23 RX ADMIN — IODIXANOL 35 ML: 320 INJECTION, SOLUTION INTRAVASCULAR at 10:38

## 2020-12-23 RX ADMIN — FENTANYL CITRATE 50 MCG: 50 INJECTION, SOLUTION INTRAMUSCULAR; INTRAVENOUS at 10:10

## 2020-12-23 RX ADMIN — ACETAMINOPHEN 975 MG: 325 TABLET, FILM COATED ORAL at 06:31

## 2020-12-23 RX ADMIN — MIDAZOLAM 1 MG: 1 INJECTION INTRAMUSCULAR; INTRAVENOUS at 10:10

## 2020-12-23 RX ADMIN — HYDROMORPHONE HYDROCHLORIDE 0.3 MG: 1 INJECTION, SOLUTION INTRAMUSCULAR; INTRAVENOUS; SUBCUTANEOUS at 11:06

## 2020-12-23 RX ADMIN — HYDROMORPHONE HYDROCHLORIDE 0.3 MG: 1 INJECTION, SOLUTION INTRAMUSCULAR; INTRAVENOUS; SUBCUTANEOUS at 20:31

## 2020-12-23 RX ADMIN — DOCUSATE SODIUM AND SENNOSIDES 2 TABLET: 8.6; 5 TABLET, FILM COATED ORAL at 20:32

## 2020-12-23 RX ADMIN — POLYETHYLENE GLYCOL 3350 17 G: 17 POWDER, FOR SOLUTION ORAL at 15:10

## 2020-12-23 RX ADMIN — LIDOCAINE HYDROCHLORIDE 5 ML: 10 INJECTION, SOLUTION EPIDURAL; INFILTRATION; INTRACAUDAL; PERINEURAL at 10:30

## 2020-12-23 RX ADMIN — MIDAZOLAM 1 MG: 1 INJECTION INTRAMUSCULAR; INTRAVENOUS at 10:15

## 2020-12-23 RX ADMIN — HEPARIN SODIUM 1 BAG: 200 INJECTION, SOLUTION INTRAVENOUS at 10:32

## 2020-12-23 RX ADMIN — ENOXAPARIN SODIUM 120 MG: 120 INJECTION SUBCUTANEOUS at 16:52

## 2020-12-23 RX ADMIN — CETIRIZINE HYDROCHLORIDE 10 MG: 5 TABLET ORAL at 21:48

## 2020-12-23 RX ADMIN — ACETAMINOPHEN 975 MG: 325 TABLET, FILM COATED ORAL at 21:48

## 2020-12-23 RX ADMIN — Medication 1 DROP: at 20:32

## 2020-12-23 RX ADMIN — DOCUSATE SODIUM AND SENNOSIDES 2 TABLET: 8.6; 5 TABLET, FILM COATED ORAL at 15:10

## 2020-12-23 RX ADMIN — OXYCODONE HYDROCHLORIDE 5 MG: 5 TABLET ORAL at 16:52

## 2020-12-23 RX ADMIN — HEPARIN SODIUM 1200 UNITS/HR: 10000 INJECTION, SOLUTION INTRAVENOUS at 01:11

## 2020-12-23 RX ADMIN — FENTANYL CITRATE 50 MCG: 50 INJECTION, SOLUTION INTRAMUSCULAR; INTRAVENOUS at 10:15

## 2020-12-23 RX ADMIN — FENTANYL CITRATE 50 MCG: 50 INJECTION, SOLUTION INTRAMUSCULAR; INTRAVENOUS at 10:26

## 2020-12-23 ASSESSMENT — ACTIVITIES OF DAILY LIVING (ADL)
ADLS_ACUITY_SCORE: 18

## 2020-12-23 NOTE — PROVIDER NOTIFICATION
6B: 6237-1: Moni Molina  In report I was told that ORIF of the Tibia was taking place tomorrow 12/23 but the pt mentioned she was told that the surgery would not be happening tomorrow. Can plan be confirmed?  Rosemary [id number]

## 2020-12-23 NOTE — PROCEDURES
[facility]     Procedure: IVC Filter placement    Date/Time: 12/23/2020 10:36 AM  Performed by: Satish Andrea MD  Authorized by: Satish Andrea MD   IR Fellow Physician: Satish Andrea    UNIVERSAL PROTOCOL   Site Marked: NA  Prior Images Obtained and Reviewed:  Yes  Required items: Required blood products, implants, devices and special equipment available    Patient identity confirmed:  Verbally with patient, arm band, provided demographic data and hospital-assigned identification number  Patient was reevaluated immediately before administering moderate or deep sedation or anesthesia  Confirmation Checklist:  Patient's identity using two indicators, relevant allergies, procedure was appropriate and matched the consent or emergent situation and correct equipment/implants were available  Time out: Immediately prior to the procedure a time out was called    Universal Protocol: the Joint Commission Universal Protocol was followed    Preparation: Patient was prepped and draped in usual sterile fashion           ANESTHESIA    Anesthesia: Local infiltration  Local Anesthetic:  Lidocaine 1% without epinephrine  Anesthetic Total (mL):  5      SEDATION    Patient Sedated: Yes    Sedation Type:  Moderate (conscious) sedation  Vital signs: Vital signs monitored during sedation    See dictated procedure note for full details.  Findings: Single IVC  Infrarenal temporary IVC filter placed.    Specimens: none    Complications: None    Condition: Stable    Plan: - Head of bed elevated to 45 deg for 2hrs    PROCEDURE   Patient Tolerance:  Patient tolerated the procedure well with no immediate complications    Length of time physician/provider present for 1:1 monitoring during sedation: 20

## 2020-12-23 NOTE — PROGRESS NOTES
Care Management Follow Up    Length of Stay (days): 2    Expected Discharge Date: 12/30/20          Additional Information:  Patient could discharge if their is a lab that could draw a Lovenox 10A on 12/25. Per  [facility] they not open Jyoti Day, I saw that there was not a PCP listed, called patient and she stated her PCP was Dr. Severich. I called the clinic where Dr. Severich works and she has not been seen there since May of 2019 so needs an appointment to reestablish care before they would follow in the event she would need home care after her surgery next week.   Unable to locate Urgent care that was open Christmas  that would be willing to do a lab draw.      [facility]  Vashti Demarco MD.  Phone [phone]. [address] [place], [place] [address]      Clarisa LEDEZMAN RN CCM  RN Care Coordinator [address] [place] [id number]  [email or web address]   Office: [phone]   Pager: [phone]    To contact Weekend RNCC, dial [phone] and enter job code [phone] at prompt. This pager can not be contacted by text page or outside line.
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
## 2020-12-23 NOTE — PLAN OF CARE
Neuro: A&Ox4.   Cardiac: SR. VSS.   Respiratory: Sating 98% on RA.  GI/: Adequate urine output.  Diet/appetite: Tolerating diet. Eating well.  Activity:  Bedrest.  Pain: At acceptable level on current regimen.   Skin: No new deficits noted.  LDA's: none    Plan: Continue with POC. Notify primary team with changes.

## 2020-12-23 NOTE — PRE-PROCEDURE
GENERAL PRE-PROCEDURE:   Procedure:  IVC Filter placement  Date/Time:  12/23/2020 9:46 AM    Written consent obtained?: Yes    Risks and benefits: Risks, benefits and alternatives were discussed    Consent given by:  Patient  Patient states understanding of procedure being performed: Yes    Patient's understanding of procedure matches consent: Yes    Procedure consent matches procedure scheduled: Yes    Expected level of sedation:  Moderate  Appropriately NPO:  Yes  ASA Class:  Class 2- mild systemic disease, no acute problems, no functional limitations  Mallampati  :  Grade 2- soft palate, base of uvula, tonsillar pillars, and portion of posterior pharyngeal wall visible  Lungs:  Lungs clear with good breath sounds bilaterally  Heart:  Normal heart sounds and rate  History & Physical reviewed:  History and physical reviewed and no updates needed  Statement of review:  I have reviewed the lab findings, diagnostic data, medications, and the plan for sedation

## 2020-12-23 NOTE — PLAN OF CARE
"/84 (BP Location: Left arm)   Pulse 83   Temp 98.6  F (37  C) (Oral)   Resp 18   Ht 1.702 m (5' 7\")   Wt 124.1 kg (273 lb 9.5 oz)   SpO2 100%   Breastfeeding No   BMI 42.85 kg/m      Neuro: A&Ox4, pleasant and cooperative throughout night, some tearfulness and sadness noted  Cardiac: VSS- SR: 80's, BP's 126-84, pulses palpable on both lower extremities, Cap refill good   Respiratory:  Maintaining adequate O2 sats via RA  GI/: bladder scanned for 156mls @ 5am: large void at 6am: un-measurable due to chux pad saturation. No BM during shift, Last BM 12/19/2020, provided education on the importance of regular bowel movements and offered bowel meds- pt refused overnight, bedpan used for voiding and BM's  Diet/appetite:  NPO diet orders d/t procedure today, otherwise regular diet  Activity:   Bedrest- assist of 2 for repositions, LLE splinted and wrapped  Pain: Denies Pain, PRN oxy and dilaudid available if needed, scheduled Tylenol QID  Skin: no new deficits noted   LDA's:   2 R- PIV:  ---upper: SL  ---lower: Heparin @ 1200units/hr: Xa to be drawn in AM    Plan: IVC filter placement today in IR, pt NPO since midnight. Plan to do ORIF surgery in approximately 1 week. Nursing will continue to follow the POC and update the MD team with concerns.    Rosemary Villagomez RN  6B Intermediate Care Unit    "
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
## 2020-12-23 NOTE — IR NOTE
Patient Name: Moni Molina  Medical Record Number: [id number]  Today's Date: 12/23/2020    Procedure: temporary IVC placement  Proceduralist: Radha Wilson and Emre    Procedure Start: 1010  Procedure end: 1030  Sedation medications administered: versed  2 Mg., fentanyl   150 Mcg.    Report given to: ALLISON Collins RN    Other Notes: Pt arrived to IR room 5  from 67B. Consent reviewed. Pt denies any questions or concerns regarding procedure. Pt positioned supine  and monitored per protocol. Pt tolerated procedure without any noted complications. Pt transferred back to 6B.

## 2020-12-24 ENCOUNTER — APPOINTMENT (OUTPATIENT)
Dept: PHYSICAL THERAPY | Facility: CLINIC | Age: 45
DRG: 982 | End: 2020-12-24
Payer: COMMERCIAL

## 2020-12-24 LAB
ANION GAP SERPL CALCULATED.3IONS-SCNC: 6 MMOL/L (ref 3–14)
APTT PPP: 88 SEC (ref 22–37)
BUN SERPL-MCNC: 11 MG/DL (ref 7–30)
CALCIUM SERPL-MCNC: 8.9 MG/DL (ref 8.5–10.1)
CHLORIDE SERPL-SCNC: 105 MMOL/L (ref 94–109)
CO2 SERPL-SCNC: 24 MMOL/L (ref 20–32)
CREAT SERPL-MCNC: 0.79 MG/DL (ref 0.52–1.04)
ERYTHROCYTE [DISTWIDTH] IN BLOOD BY AUTOMATED COUNT: 14.1 % (ref 10–15)
ERYTHROCYTE [DISTWIDTH] IN BLOOD BY AUTOMATED COUNT: 14.2 % (ref 10–15)
GFR SERPL CREATININE-BSD FRML MDRD: 90 ML/MIN/{1.73_M2}
GLUCOSE SERPL-MCNC: 82 MG/DL (ref 70–99)
HCT VFR BLD AUTO: 35.3 % (ref 35–47)
HCT VFR BLD AUTO: 36.9 % (ref 35–47)
HGB BLD-MCNC: 11.4 G/DL (ref 11.7–15.7)
HGB BLD-MCNC: 11.8 G/DL (ref 11.7–15.7)
MCH RBC QN AUTO: 27.3 PG (ref 26.5–33)
MCH RBC QN AUTO: 27.7 PG (ref 26.5–33)
MCHC RBC AUTO-ENTMCNC: 32 G/DL (ref 31.5–36.5)
MCHC RBC AUTO-ENTMCNC: 32.3 G/DL (ref 31.5–36.5)
MCV RBC AUTO: 85 FL (ref 78–100)
MCV RBC AUTO: 86 FL (ref 78–100)
PLATELET # BLD AUTO: 153 10E9/L (ref 150–450)
PLATELET # BLD AUTO: 186 10E9/L (ref 150–450)
POTASSIUM SERPL-SCNC: 3.9 MMOL/L (ref 3.4–5.3)
RBC # BLD AUTO: 4.12 10E12/L (ref 3.8–5.2)
RBC # BLD AUTO: 4.32 10E12/L (ref 3.8–5.2)
SODIUM SERPL-SCNC: 136 MMOL/L (ref 133–144)
UFH PPP CHRO-ACNC: >1.1 IU/ML
WBC # BLD AUTO: 11.9 10E9/L (ref 4–11)
WBC # BLD AUTO: 9 10E9/L (ref 4–11)

## 2020-12-24 PROCEDURE — 250N000013 HC RX MED GY IP 250 OP 250 PS 637: Performed by: STUDENT IN AN ORGANIZED HEALTH CARE EDUCATION/TRAINING PROGRAM

## 2020-12-24 PROCEDURE — 80048 BASIC METABOLIC PNL TOTAL CA: CPT | Performed by: STUDENT IN AN ORGANIZED HEALTH CARE EDUCATION/TRAINING PROGRAM

## 2020-12-24 PROCEDURE — 97161 PT EVAL LOW COMPLEX 20 MIN: CPT | Mod: GP

## 2020-12-24 PROCEDURE — 85730 THROMBOPLASTIN TIME PARTIAL: CPT | Performed by: STUDENT IN AN ORGANIZED HEALTH CARE EDUCATION/TRAINING PROGRAM

## 2020-12-24 PROCEDURE — 99233 SBSQ HOSP IP/OBS HIGH 50: CPT | Mod: GC | Performed by: INTERNAL MEDICINE

## 2020-12-24 PROCEDURE — 120N000003 HC R&B IMCU UMMC

## 2020-12-24 PROCEDURE — 36415 COLL VENOUS BLD VENIPUNCTURE: CPT | Performed by: STUDENT IN AN ORGANIZED HEALTH CARE EDUCATION/TRAINING PROGRAM

## 2020-12-24 PROCEDURE — 250N000011 HC RX IP 250 OP 636: Performed by: STUDENT IN AN ORGANIZED HEALTH CARE EDUCATION/TRAINING PROGRAM

## 2020-12-24 PROCEDURE — 36415 COLL VENOUS BLD VENIPUNCTURE: CPT | Performed by: INTERNAL MEDICINE

## 2020-12-24 PROCEDURE — 85027 COMPLETE CBC AUTOMATED: CPT | Performed by: STUDENT IN AN ORGANIZED HEALTH CARE EDUCATION/TRAINING PROGRAM

## 2020-12-24 PROCEDURE — 97530 THERAPEUTIC ACTIVITIES: CPT | Mod: GP

## 2020-12-24 PROCEDURE — 85520 HEPARIN ASSAY: CPT | Performed by: INTERNAL MEDICINE

## 2020-12-24 RX ORDER — BISACODYL 10 MG
10 SUPPOSITORY, RECTAL RECTAL DAILY PRN
Status: DISCONTINUED | OUTPATIENT
Start: 2020-12-24 | End: 2021-01-04 | Stop reason: HOSPADM

## 2020-12-24 RX ORDER — HEPARIN SODIUM 10000 [USP'U]/100ML
0-5000 INJECTION, SOLUTION INTRAVENOUS CONTINUOUS
Status: DISCONTINUED | OUTPATIENT
Start: 2020-12-24 | End: 2021-01-01

## 2020-12-24 RX ADMIN — HYDROMORPHONE HYDROCHLORIDE 0.3 MG: 1 INJECTION, SOLUTION INTRAMUSCULAR; INTRAVENOUS; SUBCUTANEOUS at 03:08

## 2020-12-24 RX ADMIN — HYDROMORPHONE HYDROCHLORIDE 0.3 MG: 1 INJECTION, SOLUTION INTRAMUSCULAR; INTRAVENOUS; SUBCUTANEOUS at 22:10

## 2020-12-24 RX ADMIN — ACETAMINOPHEN 975 MG: 325 TABLET, FILM COATED ORAL at 14:48

## 2020-12-24 RX ADMIN — CETIRIZINE HYDROCHLORIDE 10 MG: 5 TABLET ORAL at 22:10

## 2020-12-24 RX ADMIN — ACETAMINOPHEN 975 MG: 325 TABLET, FILM COATED ORAL at 06:02

## 2020-12-24 RX ADMIN — HEPARIN SODIUM 1800 UNITS/HR: 10000 INJECTION, SOLUTION INTRAVENOUS at 16:18

## 2020-12-24 RX ADMIN — HYDROMORPHONE HYDROCHLORIDE 0.3 MG: 1 INJECTION, SOLUTION INTRAMUSCULAR; INTRAVENOUS; SUBCUTANEOUS at 12:02

## 2020-12-24 RX ADMIN — DOCUSATE SODIUM AND SENNOSIDES 2 TABLET: 8.6; 5 TABLET, FILM COATED ORAL at 07:36

## 2020-12-24 RX ADMIN — ENOXAPARIN SODIUM 120 MG: 120 INJECTION SUBCUTANEOUS at 04:34

## 2020-12-24 RX ADMIN — POLYETHYLENE GLYCOL 3350 17 G: 17 POWDER, FOR SOLUTION ORAL at 07:36

## 2020-12-24 ASSESSMENT — ACTIVITIES OF DAILY LIVING (ADL)
ADLS_ACUITY_SCORE: 20
ADLS_ACUITY_SCORE: 18
ADLS_ACUITY_SCORE: 18
ADLS_ACUITY_SCORE: 20
ADLS_ACUITY_SCORE: 22
ADLS_ACUITY_SCORE: 18

## 2020-12-24 ASSESSMENT — MIFFLIN-ST. JEOR: SCORE: 1937.63
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
## 2020-12-24 NOTE — PLAN OF CARE
OT 6B: HOLD. Per discussion w/PT, pt is unsafe to discharge home prior to surgery on L LE (likely on Monday). Anticipate pt will have OT needs, but will wait until post op to assess. PT will continue to follow.
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
## 2020-12-24 NOTE — PLAN OF CARE
"/75 (BP Location: Left arm)   Pulse 103   Temp 99.7  F (37.6  C) (Oral)   Resp 20   Ht 1.702 m (5' 7\")   Wt 126 kg (277 lb 12.5 oz)   SpO2 99%   Breastfeeding No   BMI 43.51 kg/m      Patient alert and oriented x 4, pleasant and cooperative with nursing cares and medications. She does pivot transfers to the bedside commode or chair with a strong assist of two and a walker. She needs assistance to hold up her left leg to prevent weight bearing as she cannot do this. Dilaudid given for left leg pain. Appetite good. Sinus rhythm on EKG. Lungs clear. Refer to doc flow sheets, MAR and notes for other specifics. Continue cares. Order to transfer off of unit 6B.   "

## 2020-12-24 NOTE — PROVIDER NOTIFICATION
6B: 6237-1 Moni Molina  Can pt transfer to Med-Surg while awaiting surgery next week?    Rosemary JEREZ   Page back to Denise [phone]

## 2020-12-25 LAB
ANION GAP SERPL CALCULATED.3IONS-SCNC: 7 MMOL/L (ref 3–14)
BUN SERPL-MCNC: 12 MG/DL (ref 7–30)
CALCIUM SERPL-MCNC: 9.4 MG/DL (ref 8.5–10.1)
CHLORIDE SERPL-SCNC: 105 MMOL/L (ref 94–109)
CO2 SERPL-SCNC: 23 MMOL/L (ref 20–32)
CREAT SERPL-MCNC: 0.83 MG/DL (ref 0.52–1.04)
ERYTHROCYTE [DISTWIDTH] IN BLOOD BY AUTOMATED COUNT: 14.2 % (ref 10–15)
GFR SERPL CREATININE-BSD FRML MDRD: 85 ML/MIN/{1.73_M2}
GLUCOSE SERPL-MCNC: 84 MG/DL (ref 70–99)
HCT VFR BLD AUTO: 35 % (ref 35–47)
HGB BLD-MCNC: 11.3 G/DL (ref 11.7–15.7)
MCH RBC QN AUTO: 27.6 PG (ref 26.5–33)
MCHC RBC AUTO-ENTMCNC: 32.3 G/DL (ref 31.5–36.5)
MCV RBC AUTO: 85 FL (ref 78–100)
PLATELET # BLD AUTO: 190 10E9/L (ref 150–450)
POTASSIUM SERPL-SCNC: 4.1 MMOL/L (ref 3.4–5.3)
RBC # BLD AUTO: 4.1 10E12/L (ref 3.8–5.2)
SODIUM SERPL-SCNC: 135 MMOL/L (ref 133–144)
UFH PPP CHRO-ACNC: 0.51 IU/ML
UFH PPP CHRO-ACNC: 0.75 IU/ML
UFH PPP CHRO-ACNC: 0.8 IU/ML
UFH PPP CHRO-ACNC: >1.1 IU/ML
WBC # BLD AUTO: 11.1 10E9/L (ref 4–11)

## 2020-12-25 PROCEDURE — 80048 BASIC METABOLIC PNL TOTAL CA: CPT | Performed by: STUDENT IN AN ORGANIZED HEALTH CARE EDUCATION/TRAINING PROGRAM

## 2020-12-25 PROCEDURE — 85520 HEPARIN ASSAY: CPT | Performed by: STUDENT IN AN ORGANIZED HEALTH CARE EDUCATION/TRAINING PROGRAM

## 2020-12-25 PROCEDURE — 36415 COLL VENOUS BLD VENIPUNCTURE: CPT | Performed by: STUDENT IN AN ORGANIZED HEALTH CARE EDUCATION/TRAINING PROGRAM

## 2020-12-25 PROCEDURE — 99233 SBSQ HOSP IP/OBS HIGH 50: CPT | Mod: GC | Performed by: INTERNAL MEDICINE

## 2020-12-25 PROCEDURE — 85520 HEPARIN ASSAY: CPT | Performed by: INTERNAL MEDICINE

## 2020-12-25 PROCEDURE — 250N000013 HC RX MED GY IP 250 OP 250 PS 637: Performed by: STUDENT IN AN ORGANIZED HEALTH CARE EDUCATION/TRAINING PROGRAM

## 2020-12-25 PROCEDURE — 99207 PR CDG-MDM COMPONENT: MEETS MODERATE - UP CODED: CPT | Performed by: INTERNAL MEDICINE

## 2020-12-25 PROCEDURE — 120N000002 HC R&B MED SURG/OB UMMC

## 2020-12-25 PROCEDURE — 36415 COLL VENOUS BLD VENIPUNCTURE: CPT | Performed by: INTERNAL MEDICINE

## 2020-12-25 PROCEDURE — 85027 COMPLETE CBC AUTOMATED: CPT | Performed by: STUDENT IN AN ORGANIZED HEALTH CARE EDUCATION/TRAINING PROGRAM

## 2020-12-25 PROCEDURE — 250N000011 HC RX IP 250 OP 636: Performed by: STUDENT IN AN ORGANIZED HEALTH CARE EDUCATION/TRAINING PROGRAM

## 2020-12-25 RX ADMIN — ACETAMINOPHEN 975 MG: 325 TABLET, FILM COATED ORAL at 14:55

## 2020-12-25 RX ADMIN — OXYCODONE HYDROCHLORIDE 5 MG: 5 TABLET ORAL at 20:09

## 2020-12-25 RX ADMIN — ACETAMINOPHEN 975 MG: 325 TABLET, FILM COATED ORAL at 05:31

## 2020-12-25 RX ADMIN — HEPARIN SODIUM 1200 UNITS/HR: 10000 INJECTION, SOLUTION INTRAVENOUS at 08:29

## 2020-12-25 RX ADMIN — CETIRIZINE HYDROCHLORIDE 10 MG: 5 TABLET ORAL at 21:24

## 2020-12-25 RX ADMIN — HYDROMORPHONE HYDROCHLORIDE 0.3 MG: 1 INJECTION, SOLUTION INTRAMUSCULAR; INTRAVENOUS; SUBCUTANEOUS at 14:56

## 2020-12-25 RX ADMIN — OXYCODONE HYDROCHLORIDE 5 MG: 5 TABLET ORAL at 10:37

## 2020-12-25 ASSESSMENT — ACTIVITIES OF DAILY LIVING (ADL)
ADLS_ACUITY_SCORE: 20
ADLS_ACUITY_SCORE: 18

## 2020-12-25 ASSESSMENT — MIFFLIN-ST. JEOR: SCORE: 1917.63

## 2020-12-25 NOTE — PROGRESS NOTES
Pt transferred from 6B to  at 1445. Aox4. Arrived by bed. Right PIV infusing heparin gtt at 1200 units/hr, other PIV saline locked. Scheduled tylenol and IV dilaudid given for LLE pain. LLE wrapped in ACE, CMS intact. Oriented to room and given call light. Continue with POC.

## 2020-12-25 NOTE — PLAN OF CARE
Xa level > 1.10.  Per order, pause heparin gtt for 60 minutes, then decrease heparin gtt by 300 units/hr. Recheck Anti-xa 6 hours after restart.
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
## 2020-12-25 NOTE — PLAN OF CARE
Admitted/transferred from:   2 RN full, except under LLE ace splint/ace wrap, skin assessment completed by Lara Warner RN and Sharonda Smith RN.  Skin assessment finding: bruising to bilateral upper extremities, small open abrasion to right posterior thigh covered with primapore, right lower back abrasions  Interventions/actions: primapore dressing to right posterior thigh changed    Will continue to monitor.

## 2020-12-25 NOTE — PROGRESS NOTES
Transfer  Transferred to:   Via:bed  Reason for transfer:Pt no longer appropriate for 6B- improved patient condition  Belongings: Packed and sent with pt  Chart: Delivered with pt to next unit  Medications: Meds sent to new unit with pt  Report given to: tucker RN      A&O, VSS, afebrile. CMS+ LLE, R internal jugular site. Soft cast to LLE, NWB. Up A2, walker, GB to bedside commode to help support LLE. LLE pain w movement, oxycodone x 1 w some relief. Family dropped off food from home. Hep gtt rate changed at beginning of shift, recheck pending. Transfer to . Plan for surgery early next week.

## 2020-12-25 NOTE — PLAN OF CARE
Neuro: A&Ox4.   Cardiac: SR. VSS.   Respiratory: Sating 100% on RA.  GI/: Adequate urine output. BM X1  Diet/appetite: Tolerating regular diet. Eating well.  Activity:  Assist of 2 with walker and gait belt, up to chair and in halls.  Pain: At acceptable level on current regimen.   Skin: No new deficits noted.  LDA's: PIVx2. Heparin drip initiated per orders, heparin Xa resulted at >1.1, infusion was paused for 60 minutes and is set to resume at 300 units less per hour at 0000.    Plan: Continue with POC. Notify primary team with changes. Transfer orders in for 5C, awaiting bed placement.

## 2020-12-25 NOTE — PLAN OF CARE
Neuro: A&Ox4.   Cardiac: SR. VSS.       Respiratory: Sating % on RA.  GI/: Adequate urine output.  Diet/appetite: Tolerating regular diet. Eating well.  Activity:  Assist of 2 with walker and gait belt, OOB to commode.    Pain: At acceptable level on current regimen.   Skin: No new deficits noted.  LDA's: PIVx2. Heparin drip restarted at 0000 with rate changed to 1500 units.      Plan: Continue with POC. Notify primary team with changes. Transfer orders in for 5B, awaiting bed placement.

## 2020-12-26 LAB — UFH PPP CHRO-ACNC: 0.52 IU/ML

## 2020-12-26 PROCEDURE — 36415 COLL VENOUS BLD VENIPUNCTURE: CPT | Performed by: INTERNAL MEDICINE

## 2020-12-26 PROCEDURE — 250N000013 HC RX MED GY IP 250 OP 250 PS 637: Performed by: STUDENT IN AN ORGANIZED HEALTH CARE EDUCATION/TRAINING PROGRAM

## 2020-12-26 PROCEDURE — 120N000002 HC R&B MED SURG/OB UMMC

## 2020-12-26 PROCEDURE — 250N000011 HC RX IP 250 OP 636: Performed by: STUDENT IN AN ORGANIZED HEALTH CARE EDUCATION/TRAINING PROGRAM

## 2020-12-26 PROCEDURE — 99233 SBSQ HOSP IP/OBS HIGH 50: CPT | Performed by: INTERNAL MEDICINE

## 2020-12-26 PROCEDURE — 99207 PR CDG-MDM COMPONENT: MEETS MODERATE - UP CODED: CPT | Performed by: INTERNAL MEDICINE

## 2020-12-26 PROCEDURE — 85520 HEPARIN ASSAY: CPT | Performed by: INTERNAL MEDICINE

## 2020-12-26 RX ADMIN — DOCUSATE SODIUM AND SENNOSIDES 2 TABLET: 8.6; 5 TABLET, FILM COATED ORAL at 09:00

## 2020-12-26 RX ADMIN — CETIRIZINE HYDROCHLORIDE 10 MG: 5 TABLET ORAL at 21:30

## 2020-12-26 RX ADMIN — HYDROMORPHONE HYDROCHLORIDE 0.3 MG: 1 INJECTION, SOLUTION INTRAMUSCULAR; INTRAVENOUS; SUBCUTANEOUS at 17:16

## 2020-12-26 RX ADMIN — HYDROMORPHONE HYDROCHLORIDE 0.3 MG: 1 INJECTION, SOLUTION INTRAMUSCULAR; INTRAVENOUS; SUBCUTANEOUS at 22:34

## 2020-12-26 RX ADMIN — POLYETHYLENE GLYCOL 3350 17 G: 17 POWDER, FOR SOLUTION ORAL at 08:59

## 2020-12-26 RX ADMIN — OXYCODONE HYDROCHLORIDE 5 MG: 5 TABLET ORAL at 12:03

## 2020-12-26 ASSESSMENT — ACTIVITIES OF DAILY LIVING (ADL)
ADLS_ACUITY_SCORE: 18
ADLS_ACUITY_SCORE: 17

## 2020-12-26 NOTE — PLAN OF CARE
"/85 (BP Location: Left arm)   Pulse 95   Temp 98.2  F (36.8  C) (Oral)   Resp 18   Ht 1.702 m (5' 7\")   Wt 124 kg (273 lb 5.9 oz)   SpO2 100%   Breastfeeding No   BMI 42.82 kg/m      0730 - 1530    Neuro: A&Ox4.   Cardiac: SR. VSS.   Respiratory: Sating 96% on RA.  GI/: Adequate urine output. BM X1  Diet/appetite: Tolerating regular diet. Eating well.  Activity:  Assist of two up to chair.  Pain: At acceptable level on current regimen.   Skin: No new deficits noted.  LDA's:PIV infusing heparin 1100 unit/hr.  Plan: Continue with POC. Notify primary team with changes.  "

## 2020-12-26 NOTE — PLAN OF CARE
VSS. Pain tolerable with Oxycodone PRN. Denies nausea. LLE ace wrap in place, pt denies numbness/tingling and is able to wiggle toes. NWB to LLE. Up with 2 assist and gb/walker to commode. Voiding. No BM this shift. Heparin drip infusing into PIV @ 1100 units/hr. Next hep 10a to be checked at 0430. Continue to monitor and with POC.
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
## 2020-12-27 LAB
ERYTHROCYTE [DISTWIDTH] IN BLOOD BY AUTOMATED COUNT: 14.1 % (ref 10–15)
HCT VFR BLD AUTO: 35.6 % (ref 35–47)
HGB BLD-MCNC: 11.2 G/DL (ref 11.7–15.7)
MCH RBC QN AUTO: 27.2 PG (ref 26.5–33)
MCHC RBC AUTO-ENTMCNC: 31.5 G/DL (ref 31.5–36.5)
MCV RBC AUTO: 86 FL (ref 78–100)
PLATELET # BLD AUTO: 261 10E9/L (ref 150–450)
RBC # BLD AUTO: 4.12 10E12/L (ref 3.8–5.2)
UFH PPP CHRO-ACNC: 0.52 IU/ML
WBC # BLD AUTO: 8.8 10E9/L (ref 4–11)

## 2020-12-27 PROCEDURE — 99233 SBSQ HOSP IP/OBS HIGH 50: CPT | Mod: GC | Performed by: INTERNAL MEDICINE

## 2020-12-27 PROCEDURE — 99207 PR CDG-MDM COMPONENT: MEETS MODERATE - UP CODED: CPT | Performed by: INTERNAL MEDICINE

## 2020-12-27 PROCEDURE — 250N000013 HC RX MED GY IP 250 OP 250 PS 637: Performed by: STUDENT IN AN ORGANIZED HEALTH CARE EDUCATION/TRAINING PROGRAM

## 2020-12-27 PROCEDURE — 85520 HEPARIN ASSAY: CPT | Performed by: NURSE PRACTITIONER

## 2020-12-27 PROCEDURE — 85027 COMPLETE CBC AUTOMATED: CPT | Performed by: STUDENT IN AN ORGANIZED HEALTH CARE EDUCATION/TRAINING PROGRAM

## 2020-12-27 PROCEDURE — 250N000011 HC RX IP 250 OP 636: Performed by: STUDENT IN AN ORGANIZED HEALTH CARE EDUCATION/TRAINING PROGRAM

## 2020-12-27 PROCEDURE — 120N000002 HC R&B MED SURG/OB UMMC

## 2020-12-27 PROCEDURE — 36415 COLL VENOUS BLD VENIPUNCTURE: CPT | Performed by: STUDENT IN AN ORGANIZED HEALTH CARE EDUCATION/TRAINING PROGRAM

## 2020-12-27 RX ADMIN — HYDROMORPHONE HYDROCHLORIDE 0.3 MG: 1 INJECTION, SOLUTION INTRAMUSCULAR; INTRAVENOUS; SUBCUTANEOUS at 17:40

## 2020-12-27 RX ADMIN — POLYETHYLENE GLYCOL 3350 17 G: 17 POWDER, FOR SOLUTION ORAL at 08:39

## 2020-12-27 RX ADMIN — OXYCODONE HYDROCHLORIDE 5 MG: 5 TABLET ORAL at 04:28

## 2020-12-27 RX ADMIN — HEPARIN SODIUM 11 UNITS/HR: 10000 INJECTION, SOLUTION INTRAVENOUS at 05:22

## 2020-12-27 RX ADMIN — Medication 1 DROP: at 17:40

## 2020-12-27 RX ADMIN — CETIRIZINE HYDROCHLORIDE 10 MG: 5 TABLET ORAL at 22:04

## 2020-12-27 RX ADMIN — DOCUSATE SODIUM AND SENNOSIDES 2 TABLET: 8.6; 5 TABLET, FILM COATED ORAL at 08:39

## 2020-12-27 RX ADMIN — HYDROMORPHONE HYDROCHLORIDE 0.3 MG: 1 INJECTION, SOLUTION INTRAMUSCULAR; INTRAVENOUS; SUBCUTANEOUS at 22:04

## 2020-12-27 ASSESSMENT — ACTIVITIES OF DAILY LIVING (ADL)
ADLS_ACUITY_SCORE: 17

## 2020-12-27 NOTE — PLAN OF CARE
AVSS on room air. A+Ox4. No pain meds required this shift, refusing scheduled tylenol. Has available IV dilaudid. R PIV infusing therapeutic heparin gtt @ 1100 units/hr. Other R PIV SL. LLE splint w/ ace wrap CDI. Denies any numbness/tingling, able to move toes. Pulse & CMS checks completed q6h. LLE non wt bearing, supportive by two pillows per ortho request. Turning/repositioning often to prevent pressure injuries. Assist of 2 piviot to bedside commode. Passing gas, LBM 12/26. Voiding spontaneously in adequate amounts. Will continue with POC.

## 2020-12-27 NOTE — PLAN OF CARE
"/85 (BP Location: Left arm)   Pulse 95   Temp 98.2  F (36.8  C) (Oral)   Resp 18   Ht 1.702 m (5' 7\")   Wt 124 kg (273 lb 5.9 oz)   SpO2 100%   Breastfeeding No   BMI 42.82 kg/m    VSS on RA. A&Ox4. Pain managed with PRN IV dilaudid, refusing scheduled tylenol. Denies nausea, on regular diet, good PO intake this shift. Two right PIVs: one SL, the other infusing heparin gtt at 1100 units/hr with next recheck tomorrow AM. LLE NWB with ace wrap in place, denies any numbness/tingling, able to move toes without difficulty. Pulse & CMS checks Q6 hours per orders. Skin with+1/2 edema throughout, otherwise WNL. Passing gas, last BM this AM. Voiding spont, AUO. Up with A2, pivoting to the commode. Possible plan for surgery Monday or Tuesday, continue POC.     "

## 2020-12-27 NOTE — PROGRESS NOTES
Patient evaluated at bedside. States pain well controlled. LLE splint removed for evaluation of medial leg blisters. Blisters decreased in size from prior exam; small amount of serous fluid remains within blisters.    Discussed with Dr. Maloney, who will begin planning for surgery on Tuesday 12/29.    Alberto Mckay MD  PGY-4, Orthopaedic Surgery  Pager: [phone]
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
## 2020-12-27 NOTE — PLAN OF CARE
Assumed care of patient from [id number]. VSS on RA. A&Ox4. Pain managed with PRN IV dilaudid, refusing scheduled tylenol. Regular diet, denies nausea. Two right PIVs: one SL, the other infusing heparin gtt at 1100 units/hr with next recheck tomorrow AM. LLE with ace wrap in place, denies any numbness/tingling, able to move toes without difficulty, patient is none wait bearing on that leg. Pulse & CMS checks Q6 hours per orders. Skin with+1/2 edema throughout, otherwise WNL. Baby powder applied for dry skin. + BM, + flatus. Voiding spont, AUO. Up with A2, pivoting to the commode. Possible plan for surgery Monday or Tuesday, continue POC.

## 2020-12-28 LAB — UFH PPP CHRO-ACNC: 0.45 IU/ML

## 2020-12-28 PROCEDURE — 99207 PR CDG-MDM COMPONENT: MEETS MODERATE - UP CODED: CPT | Performed by: INTERNAL MEDICINE

## 2020-12-28 PROCEDURE — 99233 SBSQ HOSP IP/OBS HIGH 50: CPT | Mod: GC | Performed by: INTERNAL MEDICINE

## 2020-12-28 PROCEDURE — 250N000013 HC RX MED GY IP 250 OP 250 PS 637: Performed by: STUDENT IN AN ORGANIZED HEALTH CARE EDUCATION/TRAINING PROGRAM

## 2020-12-28 PROCEDURE — 36415 COLL VENOUS BLD VENIPUNCTURE: CPT | Performed by: NURSE PRACTITIONER

## 2020-12-28 PROCEDURE — 120N000002 HC R&B MED SURG/OB UMMC

## 2020-12-28 PROCEDURE — 85520 HEPARIN ASSAY: CPT | Performed by: NURSE PRACTITIONER

## 2020-12-28 PROCEDURE — 999N000197 HC STATISTIC WOC PT EDUCATION, 0-15 MIN

## 2020-12-28 PROCEDURE — 250N000011 HC RX IP 250 OP 636: Performed by: STUDENT IN AN ORGANIZED HEALTH CARE EDUCATION/TRAINING PROGRAM

## 2020-12-28 RX ADMIN — Medication 1 DROP: at 22:46

## 2020-12-28 RX ADMIN — CETIRIZINE HYDROCHLORIDE 10 MG: 5 TABLET ORAL at 22:41

## 2020-12-28 RX ADMIN — HYDROMORPHONE HYDROCHLORIDE 0.3 MG: 1 INJECTION, SOLUTION INTRAMUSCULAR; INTRAVENOUS; SUBCUTANEOUS at 19:49

## 2020-12-28 RX ADMIN — HYDROMORPHONE HYDROCHLORIDE 0.3 MG: 1 INJECTION, SOLUTION INTRAMUSCULAR; INTRAVENOUS; SUBCUTANEOUS at 22:41

## 2020-12-28 RX ADMIN — OXYCODONE HYDROCHLORIDE 5 MG: 5 TABLET ORAL at 01:18

## 2020-12-28 RX ADMIN — OXYCODONE HYDROCHLORIDE 5 MG: 5 TABLET ORAL at 22:46

## 2020-12-28 RX ADMIN — HEPARIN SODIUM 1100 UNITS/HR: 10000 INJECTION, SOLUTION INTRAVENOUS at 03:54

## 2020-12-28 ASSESSMENT — ACTIVITIES OF DAILY LIVING (ADL)
ADLS_ACUITY_SCORE: 17

## 2020-12-28 NOTE — PROGRESS NOTES
Brief Orthopedic Surgery Note    Surgical plan for ORIF left tibia  - Tentative plan for OR with Dr. Maloney on Wednesday 12/30 time TBD.   - Please hold heparin Tuesday evening.     Please page Orthopedic Surgery if further assistance is needed.     JONELLE THOMAS PA-C  12/28/2020 5:24 PM  Orthopaedic Surgery  Pager: [phone]     Thank you for allowing me to participate in this patient's care. Please page me at [phone] with any questions/concerns. If there is no response, if it is a weekend, or if it is during evening hours, please page the orthopaedic surgery resident on call.
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
## 2020-12-28 NOTE — PLAN OF CARE
VSS. Pt took 1 dose IV Dilaudid and Oxycodone for pain overnight. CMS + pulse checks q 6hrs per orders. LLE splinted, elevated on pillows. NWB. On therapeutic hep gtt @ 1100 units/hr, infusing into PIV. Pt can pivot to commode with assist of 2. Likes to be repositioned PRN w/ pillows. Pulsate mattress arrived overnight for pt. Awaiting surgery. Continue w/ POC.

## 2020-12-28 NOTE — PROGRESS NOTES
Clinical Nutrition Services- Brief Note    Reviewed nutrition risk factors due to LOS. Pt is tolerating a Regular diet, eating well per nursing documentation (mostly good appetite documented per flowsheets/progressnotes). No nutrition issues identified at this time. RD will continue to follow per nutrition protocol.  Belén Blanco, GABRIEL, LD  7C RD pager: [phone]

## 2020-12-28 NOTE — PLAN OF CARE
"/86 (BP Location: Left arm)   Pulse 90   Temp 98.2  F (36.8  C) (Oral)   Resp 16   Ht 1.702 m (5' 7\")   Wt 124 kg (273 lb 5.9 oz)   SpO2 100%   Breastfeeding No   BMI 42.82 kg/m      0730 - 1530    Neuro: A&Ox4.   Cardiac: SR. VSS.   Respiratory: Sating 100% on RA.  GI/: Adequate urine output. BM X1  Diet/appetite: Tolerating regular diet. Eating well.  Activity:  Assist of *, up to chair and in halls.  Pain: At acceptable level on current regimen.   Skin: No new deficits noted.  LDA's:PIV infusing heparin 1100 units/hr, level within goal. Next level in am.  New this shift: Up in a recliner with better comfort. Pt made NPO after midnight.  Plan: Continue with POC. Notify primary team with changes.  "

## 2020-12-28 NOTE — PLAN OF CARE
Cared for pt from [id number]. AVSS, on RA. Tolerating regular diet. LLE ACE wrap in place. Pulsate mattress ordered, pt c/o back hurting in bed. Repositioning Q2-3H. Up to commode Aof1-2, medium BM, good UOP. Bed bath given. 0.3mg PRN dilaudid given. Plan for OR on Tuesday.

## 2020-12-29 ENCOUNTER — PREP FOR PROCEDURE (OUTPATIENT)
Dept: ORTHOPEDICS | Facility: CLINIC | Age: 45
End: 2020-12-29

## 2020-12-29 ENCOUNTER — ANESTHESIA EVENT (OUTPATIENT)
Dept: SURGERY | Facility: CLINIC | Age: 45
DRG: 982 | End: 2020-12-29
Payer: COMMERCIAL

## 2020-12-29 DIAGNOSIS — S82.402A TIBIA/FIBULA FRACTURE, LEFT, CLOSED, INITIAL ENCOUNTER: Primary | ICD-10-CM

## 2020-12-29 DIAGNOSIS — S82.202A TIBIA/FIBULA FRACTURE, LEFT, CLOSED, INITIAL ENCOUNTER: Primary | ICD-10-CM

## 2020-12-29 LAB
ANION GAP SERPL CALCULATED.3IONS-SCNC: 7 MMOL/L (ref 3–14)
BUN SERPL-MCNC: 17 MG/DL (ref 7–30)
CALCIUM SERPL-MCNC: 9.9 MG/DL (ref 8.5–10.1)
CHLORIDE SERPL-SCNC: 104 MMOL/L (ref 94–109)
CO2 SERPL-SCNC: 24 MMOL/L (ref 20–32)
CREAT SERPL-MCNC: 0.83 MG/DL (ref 0.52–1.04)
ERYTHROCYTE [DISTWIDTH] IN BLOOD BY AUTOMATED COUNT: 13.8 % (ref 10–15)
GFR SERPL CREATININE-BSD FRML MDRD: 85 ML/MIN/{1.73_M2}
GLUCOSE SERPL-MCNC: 92 MG/DL (ref 70–99)
HCT VFR BLD AUTO: 36.5 % (ref 35–47)
HGB BLD-MCNC: 11.8 G/DL (ref 11.7–15.7)
INR PPP: 1.14 (ref 0.86–1.14)
MCH RBC QN AUTO: 28 PG (ref 26.5–33)
MCHC RBC AUTO-ENTMCNC: 32.3 G/DL (ref 31.5–36.5)
MCV RBC AUTO: 87 FL (ref 78–100)
PLATELET # BLD AUTO: 341 10E9/L (ref 150–450)
POTASSIUM SERPL-SCNC: 4.1 MMOL/L (ref 3.4–5.3)
RBC # BLD AUTO: 4.22 10E12/L (ref 3.8–5.2)
SODIUM SERPL-SCNC: 136 MMOL/L (ref 133–144)
UFH PPP CHRO-ACNC: 0.42 IU/ML
WBC # BLD AUTO: 10 10E9/L (ref 4–11)

## 2020-12-29 PROCEDURE — 250N000011 HC RX IP 250 OP 636: Performed by: STUDENT IN AN ORGANIZED HEALTH CARE EDUCATION/TRAINING PROGRAM

## 2020-12-29 PROCEDURE — 99232 SBSQ HOSP IP/OBS MODERATE 35: CPT | Performed by: PHYSICIAN ASSISTANT

## 2020-12-29 PROCEDURE — 250N000013 HC RX MED GY IP 250 OP 250 PS 637: Performed by: STUDENT IN AN ORGANIZED HEALTH CARE EDUCATION/TRAINING PROGRAM

## 2020-12-29 PROCEDURE — 36415 COLL VENOUS BLD VENIPUNCTURE: CPT | Performed by: STUDENT IN AN ORGANIZED HEALTH CARE EDUCATION/TRAINING PROGRAM

## 2020-12-29 PROCEDURE — 120N000002 HC R&B MED SURG/OB UMMC

## 2020-12-29 PROCEDURE — 85027 COMPLETE CBC AUTOMATED: CPT | Performed by: STUDENT IN AN ORGANIZED HEALTH CARE EDUCATION/TRAINING PROGRAM

## 2020-12-29 PROCEDURE — 99233 SBSQ HOSP IP/OBS HIGH 50: CPT | Mod: GC | Performed by: HOSPITALIST

## 2020-12-29 PROCEDURE — 80048 BASIC METABOLIC PNL TOTAL CA: CPT | Performed by: STUDENT IN AN ORGANIZED HEALTH CARE EDUCATION/TRAINING PROGRAM

## 2020-12-29 PROCEDURE — 85520 HEPARIN ASSAY: CPT | Performed by: STUDENT IN AN ORGANIZED HEALTH CARE EDUCATION/TRAINING PROGRAM

## 2020-12-29 PROCEDURE — 85610 PROTHROMBIN TIME: CPT | Performed by: STUDENT IN AN ORGANIZED HEALTH CARE EDUCATION/TRAINING PROGRAM

## 2020-12-29 RX ADMIN — OXYCODONE HYDROCHLORIDE 5 MG: 5 TABLET ORAL at 11:11

## 2020-12-29 RX ADMIN — ACETAMINOPHEN 975 MG: 325 TABLET, FILM COATED ORAL at 11:11

## 2020-12-29 RX ADMIN — Medication 1 DROP: at 20:04

## 2020-12-29 RX ADMIN — CETIRIZINE HYDROCHLORIDE 10 MG: 5 TABLET ORAL at 20:07

## 2020-12-29 RX ADMIN — HYDROMORPHONE HYDROCHLORIDE 0.3 MG: 1 INJECTION, SOLUTION INTRAMUSCULAR; INTRAVENOUS; SUBCUTANEOUS at 19:58

## 2020-12-29 RX ADMIN — Medication 1 MG: at 20:08

## 2020-12-29 RX ADMIN — HEPARIN SODIUM 1100 UNITS/HR: 10000 INJECTION, SOLUTION INTRAVENOUS at 02:46

## 2020-12-29 RX ADMIN — OXYCODONE HYDROCHLORIDE 5 MG: 5 TABLET ORAL at 19:58

## 2020-12-29 ASSESSMENT — ACTIVITIES OF DAILY LIVING (ADL)
ADLS_ACUITY_SCORE: 17

## 2020-12-29 NOTE — PLAN OF CARE
Alert and oriented x 4. Vital sings table. On room air. On heparin drip. Left leg pain managed with oxycodone and Tylenol.No weight bearing on left leg. Good oral intake. NPO after midnight for tomorrow orthopedic surgery. Voiding at the bedside commode.
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
## 2020-12-29 NOTE — PLAN OF CARE
AVSS on room air. A+Ox4. Pivoting to commode/recliner with walker, GB, and assist of 1 staff- LLE immobile in split+ACE wrap & non-weight bearing. LLE and R shoulder pain controlled with PRN IV dilaudid & oxy, still refusing scheduled tylenol. Tolerating regular diet w/o nausea or vomiting. LBM today, passing gas. Voiding spontaneously w/o difficulty in good amts. Heparin gtt therapeutic @ 1100 ml/hr, hep Xa recheck in the AM. Plan for surgery on Wednesday 12/30 d/t ortho scheduling conflicts. Continue with POC.
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
## 2020-12-29 NOTE — PLAN OF CARE
VSS on room air. AOx4. Up with assist x1 with walker/gait belt. Non-weight bearing to LLE, leg wrapped and splinted with ACE. LLE elevated on pillows. Heparin gtt infusing into right PIV, next heparin 10A recheck this AM. Voids into bedside commode, no BM this shift, but reports passing flatus. Tolerating regular diet, denies nausea/vomiting. Continue with POC. Possible surgery tomorrow.

## 2020-12-30 ENCOUNTER — APPOINTMENT (OUTPATIENT)
Dept: GENERAL RADIOLOGY | Facility: CLINIC | Age: 45
DRG: 982 | End: 2020-12-30
Attending: ORTHOPAEDIC SURGERY
Payer: COMMERCIAL

## 2020-12-30 ENCOUNTER — ANESTHESIA (OUTPATIENT)
Dept: SURGERY | Facility: CLINIC | Age: 45
DRG: 982 | End: 2020-12-30
Payer: COMMERCIAL

## 2020-12-30 ENCOUNTER — APPOINTMENT (OUTPATIENT)
Dept: GENERAL RADIOLOGY | Facility: CLINIC | Age: 45
DRG: 982 | End: 2020-12-30
Attending: PHYSICIAN ASSISTANT
Payer: COMMERCIAL

## 2020-12-30 LAB
ABO + RH BLD: NORMAL
ABO + RH BLD: NORMAL
BLD GP AB SCN SERPL QL: NORMAL
BLOOD BANK CMNT PATIENT-IMP: NORMAL
ERYTHROCYTE [DISTWIDTH] IN BLOOD BY AUTOMATED COUNT: 13.7 % (ref 10–15)
HCT VFR BLD AUTO: 37.4 % (ref 35–47)
HGB BLD-MCNC: 11.8 G/DL (ref 11.7–15.7)
MCH RBC QN AUTO: 27.1 PG (ref 26.5–33)
MCHC RBC AUTO-ENTMCNC: 31.6 G/DL (ref 31.5–36.5)
MCV RBC AUTO: 86 FL (ref 78–100)
PLATELET # BLD AUTO: 339 10E9/L (ref 150–450)
RBC # BLD AUTO: 4.35 10E12/L (ref 3.8–5.2)
SPECIMEN EXP DATE BLD: NORMAL
WBC # BLD AUTO: 8.5 10E9/L (ref 4–11)

## 2020-12-30 PROCEDURE — 250N000009 HC RX 250: Performed by: NURSE ANESTHETIST, CERTIFIED REGISTERED

## 2020-12-30 PROCEDURE — 250N000013 HC RX MED GY IP 250 OP 250 PS 637: Performed by: STUDENT IN AN ORGANIZED HEALTH CARE EDUCATION/TRAINING PROGRAM

## 2020-12-30 PROCEDURE — 120N000002 HC R&B MED SURG/OB UMMC

## 2020-12-30 PROCEDURE — 360N000029 HC SURGERY LEVEL 4 EA 15 ADDTL MIN - UMMC: Performed by: ORTHOPAEDIC SURGERY

## 2020-12-30 PROCEDURE — 999N000065 XR TIBIA & FIBULA PORT LT 2 VW: Mod: LT

## 2020-12-30 PROCEDURE — 86900 BLOOD TYPING SEROLOGIC ABO: CPT | Performed by: ANESTHESIOLOGY

## 2020-12-30 PROCEDURE — 250N000011 HC RX IP 250 OP 636: Performed by: NURSE ANESTHETIST, CERTIFIED REGISTERED

## 2020-12-30 PROCEDURE — C1713 ANCHOR/SCREW BN/BN,TIS/BN: HCPCS | Performed by: ORTHOPAEDIC SURGERY

## 2020-12-30 PROCEDURE — 250N000011 HC RX IP 250 OP 636: Performed by: ANESTHESIOLOGY

## 2020-12-30 PROCEDURE — 250N000011 HC RX IP 250 OP 636: Performed by: STUDENT IN AN ORGANIZED HEALTH CARE EDUCATION/TRAINING PROGRAM

## 2020-12-30 PROCEDURE — 0QSH06Z REPOSITION LEFT TIBIA WITH INTRAMEDULLARY INTERNAL FIXATION DEVICE, OPEN APPROACH: ICD-10-PCS | Performed by: ORTHOPAEDIC SURGERY

## 2020-12-30 PROCEDURE — 370N000002 HC ANESTHESIA TECHNICAL FEE, EACH ADDTL 15 MIN: Performed by: ORTHOPAEDIC SURGERY

## 2020-12-30 PROCEDURE — 73590 X-RAY EXAM OF LOWER LEG: CPT | Mod: 26 | Performed by: RADIOLOGY

## 2020-12-30 PROCEDURE — 27759 TREATMENT OF TIBIA FRACTURE: CPT | Mod: AS | Performed by: PHYSICIAN ASSISTANT

## 2020-12-30 PROCEDURE — 99233 SBSQ HOSP IP/OBS HIGH 50: CPT | Mod: GC | Performed by: HOSPITALIST

## 2020-12-30 PROCEDURE — 761N000003 HC RECOVERY PHASE 1 LEVEL 2 FIRST HR: Performed by: ORTHOPAEDIC SURGERY

## 2020-12-30 PROCEDURE — 370N000001 HC ANESTHESIA TECHNICAL FEE, 1ST 30 MIN: Performed by: ORTHOPAEDIC SURGERY

## 2020-12-30 PROCEDURE — 85027 COMPLETE CBC AUTOMATED: CPT | Performed by: STUDENT IN AN ORGANIZED HEALTH CARE EDUCATION/TRAINING PROGRAM

## 2020-12-30 PROCEDURE — 258N000003 HC RX IP 258 OP 636: Performed by: NURSE ANESTHETIST, CERTIFIED REGISTERED

## 2020-12-30 PROCEDURE — 36415 COLL VENOUS BLD VENIPUNCTURE: CPT | Performed by: STUDENT IN AN ORGANIZED HEALTH CARE EDUCATION/TRAINING PROGRAM

## 2020-12-30 PROCEDURE — 999N000180 XR SURGERY CARM FLUORO LESS THAN 5 MIN: Mod: TC

## 2020-12-30 PROCEDURE — 272N000001 HC OR GENERAL SUPPLY STERILE: Performed by: ORTHOPAEDIC SURGERY

## 2020-12-30 PROCEDURE — 86850 RBC ANTIBODY SCREEN: CPT | Performed by: ANESTHESIOLOGY

## 2020-12-30 PROCEDURE — 999N000139 HC STATISTIC PRE-PROCEDURE ASSESSMENT II: Performed by: ORTHOPAEDIC SURGERY

## 2020-12-30 PROCEDURE — 272N000002 HC OR SUPPLY OTHER OPNP: Performed by: ORTHOPAEDIC SURGERY

## 2020-12-30 PROCEDURE — 250N000003 HC SEVOFLURANE, EA 15 MIN: Performed by: ORTHOPAEDIC SURGERY

## 2020-12-30 PROCEDURE — 360N000031 HC SURGERY LEVEL 4 W FLUORO 1ST 30 MIN - UMMC: Performed by: ORTHOPAEDIC SURGERY

## 2020-12-30 PROCEDURE — 86901 BLOOD TYPING SEROLOGIC RH(D): CPT | Performed by: ANESTHESIOLOGY

## 2020-12-30 DEVICE — IMPLANTABLE DEVICE: Type: IMPLANTABLE DEVICE | Site: LEG | Status: FUNCTIONAL

## 2020-12-30 RX ORDER — CEFAZOLIN SODIUM 1 G/3ML
INJECTION, POWDER, FOR SOLUTION INTRAMUSCULAR; INTRAVENOUS PRN
Status: DISCONTINUED | OUTPATIENT
Start: 2020-12-30 | End: 2020-12-30

## 2020-12-30 RX ORDER — ONDANSETRON 2 MG/ML
INJECTION INTRAMUSCULAR; INTRAVENOUS PRN
Status: DISCONTINUED | OUTPATIENT
Start: 2020-12-30 | End: 2020-12-30

## 2020-12-30 RX ORDER — ONDANSETRON 2 MG/ML
4 INJECTION INTRAMUSCULAR; INTRAVENOUS EVERY 30 MIN PRN
Status: DISCONTINUED | OUTPATIENT
Start: 2020-12-30 | End: 2020-12-30 | Stop reason: HOSPADM

## 2020-12-30 RX ORDER — SODIUM CHLORIDE, SODIUM LACTATE, POTASSIUM CHLORIDE, CALCIUM CHLORIDE 600; 310; 30; 20 MG/100ML; MG/100ML; MG/100ML; MG/100ML
INJECTION, SOLUTION INTRAVENOUS CONTINUOUS
Status: DISCONTINUED | OUTPATIENT
Start: 2020-12-30 | End: 2020-12-30 | Stop reason: HOSPADM

## 2020-12-30 RX ORDER — NALOXONE HYDROCHLORIDE 0.4 MG/ML
0.2 INJECTION, SOLUTION INTRAMUSCULAR; INTRAVENOUS; SUBCUTANEOUS
Status: ACTIVE | OUTPATIENT
Start: 2020-12-30 | End: 2020-12-31

## 2020-12-30 RX ORDER — LIDOCAINE 40 MG/G
CREAM TOPICAL
Status: DISCONTINUED | OUTPATIENT
Start: 2020-12-30 | End: 2020-12-30 | Stop reason: HOSPADM

## 2020-12-30 RX ORDER — PROPOFOL 10 MG/ML
INJECTION, EMULSION INTRAVENOUS CONTINUOUS PRN
Status: DISCONTINUED | OUTPATIENT
Start: 2020-12-30 | End: 2020-12-30

## 2020-12-30 RX ORDER — EPHEDRINE SULFATE 50 MG/ML
INJECTION, SOLUTION INTRAMUSCULAR; INTRAVENOUS; SUBCUTANEOUS PRN
Status: DISCONTINUED | OUTPATIENT
Start: 2020-12-30 | End: 2020-12-30

## 2020-12-30 RX ORDER — NALOXONE HYDROCHLORIDE 0.4 MG/ML
0.4 INJECTION, SOLUTION INTRAMUSCULAR; INTRAVENOUS; SUBCUTANEOUS
Status: ACTIVE | OUTPATIENT
Start: 2020-12-30 | End: 2020-12-31

## 2020-12-30 RX ORDER — LIDOCAINE HYDROCHLORIDE 20 MG/ML
INJECTION, SOLUTION INFILTRATION; PERINEURAL PRN
Status: DISCONTINUED | OUTPATIENT
Start: 2020-12-30 | End: 2020-12-30

## 2020-12-30 RX ORDER — ONDANSETRON 4 MG/1
4 TABLET, ORALLY DISINTEGRATING ORAL EVERY 30 MIN PRN
Status: DISCONTINUED | OUTPATIENT
Start: 2020-12-30 | End: 2020-12-30 | Stop reason: HOSPADM

## 2020-12-30 RX ORDER — HYDROMORPHONE HYDROCHLORIDE 1 MG/ML
.3-.5 INJECTION, SOLUTION INTRAMUSCULAR; INTRAVENOUS; SUBCUTANEOUS EVERY 5 MIN PRN
Status: DISCONTINUED | OUTPATIENT
Start: 2020-12-30 | End: 2020-12-30 | Stop reason: HOSPADM

## 2020-12-30 RX ORDER — BUPIVACAINE HYDROCHLORIDE 2.5 MG/ML
INJECTION, SOLUTION EPIDURAL; INFILTRATION; INTRACAUDAL PRN
Status: DISCONTINUED | OUTPATIENT
Start: 2020-12-30 | End: 2020-12-30

## 2020-12-30 RX ORDER — MEPERIDINE HYDROCHLORIDE 25 MG/ML
12.5 INJECTION INTRAMUSCULAR; INTRAVENOUS; SUBCUTANEOUS EVERY 5 MIN PRN
Status: DISCONTINUED | OUTPATIENT
Start: 2020-12-30 | End: 2020-12-30 | Stop reason: HOSPADM

## 2020-12-30 RX ORDER — SODIUM CHLORIDE, SODIUM LACTATE, POTASSIUM CHLORIDE, CALCIUM CHLORIDE 600; 310; 30; 20 MG/100ML; MG/100ML; MG/100ML; MG/100ML
INJECTION, SOLUTION INTRAVENOUS CONTINUOUS PRN
Status: DISCONTINUED | OUTPATIENT
Start: 2020-12-30 | End: 2020-12-30

## 2020-12-30 RX ORDER — LABETALOL HYDROCHLORIDE 5 MG/ML
INJECTION, SOLUTION INTRAVENOUS PRN
Status: DISCONTINUED | OUTPATIENT
Start: 2020-12-30 | End: 2020-12-30

## 2020-12-30 RX ORDER — PROPOFOL 10 MG/ML
INJECTION, EMULSION INTRAVENOUS PRN
Status: DISCONTINUED | OUTPATIENT
Start: 2020-12-30 | End: 2020-12-30

## 2020-12-30 RX ORDER — FENTANYL CITRATE 50 UG/ML
INJECTION, SOLUTION INTRAMUSCULAR; INTRAVENOUS PRN
Status: DISCONTINUED | OUTPATIENT
Start: 2020-12-30 | End: 2020-12-30

## 2020-12-30 RX ORDER — FENTANYL CITRATE 50 UG/ML
25-50 INJECTION, SOLUTION INTRAMUSCULAR; INTRAVENOUS
Status: DISCONTINUED | OUTPATIENT
Start: 2020-12-30 | End: 2020-12-30 | Stop reason: HOSPADM

## 2020-12-30 RX ADMIN — CETIRIZINE HYDROCHLORIDE 10 MG: 5 TABLET ORAL at 20:19

## 2020-12-30 RX ADMIN — HYDROMORPHONE HYDROCHLORIDE 0.5 MG: 1 INJECTION, SOLUTION INTRAMUSCULAR; INTRAVENOUS; SUBCUTANEOUS at 17:18

## 2020-12-30 RX ADMIN — MIDAZOLAM 2 MG: 1 INJECTION INTRAMUSCULAR; INTRAVENOUS at 13:24

## 2020-12-30 RX ADMIN — ONDANSETRON 4 MG: 2 INJECTION INTRAMUSCULAR; INTRAVENOUS at 15:03

## 2020-12-30 RX ADMIN — FENTANYL CITRATE 100 MCG: 50 INJECTION, SOLUTION INTRAMUSCULAR; INTRAVENOUS at 14:03

## 2020-12-30 RX ADMIN — SODIUM CHLORIDE, POTASSIUM CHLORIDE, SODIUM LACTATE AND CALCIUM CHLORIDE: 600; 310; 30; 20 INJECTION, SOLUTION INTRAVENOUS at 13:24

## 2020-12-30 RX ADMIN — LABETALOL HYDROCHLORIDE 5 MG: 5 INJECTION INTRAVENOUS at 14:18

## 2020-12-30 RX ADMIN — FENTANYL CITRATE 50 MCG: 50 INJECTION, SOLUTION INTRAMUSCULAR; INTRAVENOUS at 17:10

## 2020-12-30 RX ADMIN — ROCURONIUM BROMIDE 80 MG: 10 INJECTION INTRAVENOUS at 13:36

## 2020-12-30 RX ADMIN — FENTANYL CITRATE 100 MCG: 50 INJECTION, SOLUTION INTRAMUSCULAR; INTRAVENOUS at 13:36

## 2020-12-30 RX ADMIN — FENTANYL CITRATE 50 MCG: 50 INJECTION, SOLUTION INTRAMUSCULAR; INTRAVENOUS at 14:06

## 2020-12-30 RX ADMIN — PROPOFOL 20 MG: 10 INJECTION, EMULSION INTRAVENOUS at 16:33

## 2020-12-30 RX ADMIN — FENTANYL CITRATE 25 MCG: 50 INJECTION, SOLUTION INTRAMUSCULAR; INTRAVENOUS at 16:43

## 2020-12-30 RX ADMIN — FENTANYL CITRATE 50 MCG: 50 INJECTION, SOLUTION INTRAMUSCULAR; INTRAVENOUS at 15:31

## 2020-12-30 RX ADMIN — FENTANYL CITRATE 25 MCG: 50 INJECTION, SOLUTION INTRAMUSCULAR; INTRAVENOUS at 17:00

## 2020-12-30 RX ADMIN — PROPOFOL 20 MG: 10 INJECTION, EMULSION INTRAVENOUS at 16:28

## 2020-12-30 RX ADMIN — FENTANYL CITRATE 50 MCG: 50 INJECTION, SOLUTION INTRAMUSCULAR; INTRAVENOUS at 15:14

## 2020-12-30 RX ADMIN — LIDOCAINE HYDROCHLORIDE 100 MG: 20 INJECTION, SOLUTION INFILTRATION; PERINEURAL at 13:36

## 2020-12-30 RX ADMIN — OXYCODONE HYDROCHLORIDE 5 MG: 5 TABLET ORAL at 21:31

## 2020-12-30 RX ADMIN — PROPOFOL 30 MCG/KG/MIN: 10 INJECTION, EMULSION INTRAVENOUS at 14:13

## 2020-12-30 RX ADMIN — Medication 10 MG: at 13:36

## 2020-12-30 RX ADMIN — HYDROMORPHONE HYDROCHLORIDE 0.3 MG: 1 INJECTION, SOLUTION INTRAMUSCULAR; INTRAVENOUS; SUBCUTANEOUS at 00:14

## 2020-12-30 RX ADMIN — LABETALOL HYDROCHLORIDE 5 MG: 5 INJECTION INTRAVENOUS at 16:33

## 2020-12-30 RX ADMIN — FENTANYL CITRATE 25 MCG: 50 INJECTION, SOLUTION INTRAMUSCULAR; INTRAVENOUS at 17:15

## 2020-12-30 RX ADMIN — CEFAZOLIN 2 G: 1 INJECTION, POWDER, FOR SOLUTION INTRAMUSCULAR; INTRAVENOUS at 13:45

## 2020-12-30 RX ADMIN — HYDROMORPHONE HYDROCHLORIDE 0.5 MG: 1 INJECTION, SOLUTION INTRAMUSCULAR; INTRAVENOUS; SUBCUTANEOUS at 16:11

## 2020-12-30 RX ADMIN — CEFAZOLIN 1 G: 1 INJECTION, POWDER, FOR SOLUTION INTRAMUSCULAR; INTRAVENOUS at 15:50

## 2020-12-30 RX ADMIN — FENTANYL CITRATE 25 MCG: 50 INJECTION, SOLUTION INTRAMUSCULAR; INTRAVENOUS at 16:22

## 2020-12-30 RX ADMIN — ACETAMINOPHEN 975 MG: 325 TABLET, FILM COATED ORAL at 20:13

## 2020-12-30 RX ADMIN — HYDROMORPHONE HYDROCHLORIDE 0.3 MG: 1 INJECTION, SOLUTION INTRAMUSCULAR; INTRAVENOUS; SUBCUTANEOUS at 20:04

## 2020-12-30 RX ADMIN — BUPIVACAINE HYDROCHLORIDE 20 ML: 2.5 INJECTION, SOLUTION EPIDURAL; INFILTRATION; INTRACAUDAL; PERINEURAL at 16:39

## 2020-12-30 RX ADMIN — OXYCODONE HYDROCHLORIDE 5 MG: 5 TABLET ORAL at 09:59

## 2020-12-30 RX ADMIN — SUGAMMADEX 200 MG: 100 INJECTION, SOLUTION INTRAVENOUS at 16:39

## 2020-12-30 RX ADMIN — Medication 1 DROP: at 20:22

## 2020-12-30 RX ADMIN — PROPOFOL 200 MG: 10 INJECTION, EMULSION INTRAVENOUS at 13:36

## 2020-12-30 ASSESSMENT — ACTIVITIES OF DAILY LIVING (ADL)
ADLS_ACUITY_SCORE: 17
ADLS_ACUITY_SCORE: 17
ADLS_ACUITY_SCORE: 19
ADLS_ACUITY_SCORE: 17
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
## 2020-12-30 NOTE — ANESTHESIA PREPROCEDURE EVALUATION
"Anesthesia Pre-Procedure Evaluation    Patient: Moni Molina   MRN:     [id number] Gender:   female   Age:    45 year old :      1975        Preoperative Diagnosis: Tibia/fibula fracture, left, closed, initial encounter [S82.202A, S82.402A]   Procedure(s):  OPEN REDUCTION INTERNAL FIXATION, FRACTURE, TIBIA     LABS:  CBC:   Lab Results   Component Value Date    WBC 8.5 2020    WBC 10.0 2020    HGB 11.8 2020    HGB 11.8 2020    HCT 37.4 2020    HCT 36.5 2020     2020     2020     BMP:   Lab Results   Component Value Date     2020     2020    POTASSIUM 4.1 2020    POTASSIUM 4.1 2020    CHLORIDE 104 2020    CHLORIDE 105 2020    CO2 24 2020    CO2 23 2020    BUN 17 2020    BUN 12 2020    CR 0.83 2020    CR 0.83 2020    GLC 92 2020    GLC 84 2020     COAGS:   Lab Results   Component Value Date    PTT 88 (H) 2020    INR 1.14 2020     POC:   Lab Results   Component Value Date    BGM 93 2020    HCGS Negative 2020     OTHER:   Lab Results   Component Value Date    LACT 1.0 2020    BALJINDER 9.9 2020    ALBUMIN 3.4 2020    PROTTOTAL 7.6 2020    ALT 36 2020    AST 46 (H) 2020    ALKPHOS 62 2020    BILITOTAL 0.7 2020    LIPASE 116 2020        Preop Vitals    BP Readings from Last 3 Encounters:   20 125/86    Pulse Readings from Last 3 Encounters:   20 101      Resp Readings from Last 3 Encounters:   20 16    SpO2 Readings from Last 3 Encounters:   20 95%      Temp Readings from Last 1 Encounters:   20 37.1  C (98.8  F) (Oral)    Ht Readings from Last 1 Encounters:   20 1.702 m (5' 7\")      Wt Readings from Last 1 Encounters:   20 124 kg (273 lb 5.9 oz)    Estimated body mass index is 42.82 kg/m  as calculated from the following:    Height as of this " "encounter: 1.702 m (5' 7\").    Weight as of this encounter: 124 kg (273 lb 5.9 oz).     LDA:  Peripheral IV 12/21/20 Right Upper forearm (Active)   Site Assessment WDL 12/30/20 1151   Line Status Saline locked 12/30/20 1151   Dressing Intervention Dressing reinforced 12/30/20 0800   Phlebitis Scale 0-->no symptoms 12/30/20 1151   Infiltration Scale 0 12/30/20 1151   Infiltration Site Treatment Method  None 12/30/20 0800   Number of days: 9       Peripheral IV 12/21/20 Right Lower forearm (Active)   Site Assessment WDL 12/30/20 1151   Line Status Saline locked 12/30/20 1151   Dressing Intervention Dressing reinforced 12/30/20 0800   Phlebitis Scale 0-->no symptoms 12/30/20 1151   Infiltration Scale 0 12/30/20 1151   Infiltration Site Treatment Method  None 12/30/20 0800   Number of days: 9        History reviewed. No pertinent past medical history.   Past Surgical History:   Procedure Laterality Date     IR IVC FILTER PLACEMENT  12/23/2020      No Known Allergies     Anesthesia Evaluation     . Pt has not had prior anesthetic            ROS/MED HX    ENT/Pulmonary:  - neg pulmonary ROS     Neurologic:  - neg neurologic ROS     Cardiovascular: Comment: Asymptomatic PE    (+) ----. : . . . :. . Previous cardiac testing Echodate:12/21/20results:Interpretation Summary  Technically difficult study.  Left ventricular function, chamber size, wall motion, and wall thickness are  normal.The EF is 60-65%.  RV size and function are probably normal on limited views.  Pulmonary artery systolic pressure is normal.  Pulmonary artery systolic pressure is 29 mmHg (26 mmHg+RA pressure).  Previous study not available for comparison.  _____________________________________________________________________________  __        Left Ventricle  Left ventricular function, chamber size, wall motion, and wall thickness are  normal.The EF is 60-65%. Left ventricular diastolic function is not  assessable. A false tendon is noted.     Right " Ventricle  RV size and function are probably normal on limited views.     Atria  The atria cannot be assessed.     Mitral Valve  The mitral valve is normal.        Aortic Valve  The valve leaflets are not well visualized. On Doppler interrogation, there is  no significant stenosis or regurgitation.     Tricuspid Valve  The tricuspid valve is normal. Trace tricuspid insufficiency is present.  Pulmonary artery systolic pressure is normal. Pulmonary artery systolic  pressure is 29 mmHg (26 mmHg+RA pressure).     Pulmonic Valve  The pulmonic valve is normal.     Vessels  The aorta root is normal. The pulmonary artery cannot be assessed. The  inferior vena cava was normal in size with preserved respiratory variability.  IVC diameter <2.1 cm collapsing >50% with sniff suggests a normal RA pressure  of 3 mmHg.     Pericardium  No pericardial effusion is present.        Compared to Previous Study  Previous study not available for comparison.date: results: date: results: date: results:          METS/Exercise Tolerance:     Hematologic: Comments: DVT/PE on heparin drip for 1 week, s/p IVC filter placement    (+) History of blood clots pt is anticoagulated, -      Musculoskeletal:   (+) fracture lower extremity, -       GI/Hepatic:        (-) GERD   Renal/Genitourinary:  - ROS Renal section negative       Endo:     (+) Obesity, .      Psychiatric:  - neg psychiatric ROS       Infectious Disease:  - neg infectious disease ROS       Malignancy:      - no malignancy   Other:                         PHYSICAL EXAM:   Mental Status/Neuro: A/A/O   Airway: Facies: Thick Neck  Mallampati: II  Mouth/Opening: Full  TM distance: > 6 cm  Neck ROM: Full   Respiratory:   Resp. Rate: Normal     Resp. Effort: Normal      CV:    Comments:                      Assessment:   ASA SCORE: 3    H&P: History and physical reviewed and following examination; no interval change.   Smoking Status:  Non-Smoker/Unknown   NPO Status: NPO Appropriate      Plan:   Anes. Type:  General   Pre-Medication: None   Induction:  IV (Standard)   Airway: ETT; Oral   Access/Monitoring: PIV; 2nd PIV   Maintenance: Balanced     Postop Plan:   Postop Pain: Opioids  Postop Sedation/Airway: Not planned  Disposition: Inpatient/Admit     PONV Management:   Adult Risk Factors: Female, Non-Smoker, Postop Opioids   Prevention: Ondansetron, Dexamethasone     CONSENT: Direct conversation   Plan and risks discussed with: Patient   Blood Products: Consented (ALL Blood Products)                   Dwayne Mcconnell MD

## 2020-12-30 NOTE — ANESTHESIA PROCEDURE NOTES
Peripheral Nerve Block Procedure Note      Staff -   Anesthesiologist:  Jose Reyes MD  Resident/Fellow: Meaghan Tompkins MD  Performed By: resident  Location: OR AFTER induction  Procedure Start/Stop TImes:     patient identified, IV checked, site marked, risks and benefits discussed, informed consent, monitors and equipment checked, pre-op evaluation, at physician/surgeon's request and post-op pain management      Correct Patient: Yes      Correct Position: Yes      Correct Site: Yes      Correct Procedure: Yes      Correct Laterality:  Yes    Site Marked:  Yes  Procedure details:     Procedure:  Adductor canal    ASA:  3    Diagnosis:  Postoperative pain relief    Laterality:  Left    Position:  Supine    Sterile Prep: chloraprep, mask and sterile gloves      Local skin infiltration:  None    Needle:  Insulated    Needle gauge:  21    Needle length (mm):  110    Ultrasound: Yes      Ultrasound used to identify targeted nerve, plexus, or vascular structure and placed a needle adjacent to it      Permanent Image entered into patiient's record      Abnormal pain on injection: No      Blood Aspirated: No      Paresthesias:  No    Bleeding at site: No      Bolus via:  Needle    Infusion Method:  Single Shot    Complications:  None  Assessment/Narrative:     Injection made incrementally with aspirations every (mL):  5

## 2020-12-30 NOTE — ANESTHESIA POSTPROCEDURE EVALUATION
Anesthesia POST Procedure Evaluation    Patient: Moni Molina   MRN:     [id number] Gender:   female   Age:    45 year old :      1975        Preoperative Diagnosis: Tibia/fibula fracture, left, closed, initial encounter [S82.202A, S82.402A]   Procedure(s):  OPEN REDUCTION INTERNAL FIXATION, FRACTURE, TIBIA   Postop Comments: No value filed.     Anesthesia Type: General       Disposition: Admission   Postop Pain Control: Uneventful            Sign Out: Well controlled pain   PONV: No   Neuro/Psych: Uneventful            Sign Out: Acceptable/Baseline neuro status   Airway/Respiratory: Uneventful            Sign Out: Acceptable/Baseline resp. status   CV/Hemodynamics: Uneventful            Sign Out: Acceptable CV status   Other NRE: NONE   DID A NON-ROUTINE EVENT OCCUR? No         Last Anesthesia Record Vitals:  CRNA VITALS  2020 1613 - 2020 1713      2020             NIBP:  130/64    NIBP Mean:  93    Ht Rate:  89    SpO2:  100 %          Last PACU Vitals:  Vitals Value Taken Time   /100 20 1715   Temp     Pulse 89 20 1724   Resp 20 20 1700   SpO2 99 % 20 1724   Temp src     NIBP 130/64 20 1647   Pulse     SpO2 100 % 20 1647   Resp     Temp     Ht Rate 89 20 1647   Temp 2     Vitals shown include unvalidated device data.      Electronically Signed By: Aristeo Awan MD, 2020, 5:25 PM
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
## 2020-12-30 NOTE — BRIEF OP NOTE
[facility]     Brief Operative Note    Pre-operative diagnosis: Tibia/fibula fracture, left, closed, initial encounter [S82.202A, S82.402A]  Post-operative diagnosis Same as pre-operative diagnosis    Procedure: Procedure(s):  OPEN REDUCTION INTERNAL FIXATION, FRACTURE, TIBIA  Surgeon: Surgeon(s) and Role:     * Damion Maloney MD - Primary     * Checo Hunt PA-C  Anesthesia: Choice   Estimated blood loss: 50cc  Drains: None  Specimens: * No specimens in log *  Findings:   See Op Note.  Complications: None.  Implants:   Implant Name Type Inv. Item Serial No.  Lot No. LRB No. Used Action   T2 ALPHA TIBIAL NAIL   NONE ERAN [id number] Left 1 Implanted   LOCKING SCREW   NONE ERAN [id number] Left 1 Implanted   LOCKING SCREW   NONE ERAN [id number] Left 1 Implanted   LOCKING SCREW   NONE ERAN [id number] Left 1 Implanted and Explanted   LOCKING SCREW    NONE ERAN [id number] Left 1 Implanted and Explanted   LOCKING SCREW [id number]   NONE ERAN [id number] Left 1 Implanted   LOCKING SCREW [id number]   NONE ERAN [id number] Left 1 Implanted       PLAN:  Medicine Primary  Activity: Up with assist.  Weight bearing status: NWB LLE.   Pain management: Per Primary.   DVT prophylaxis: Per Primary/hematology.   Imaging:  AP/Lat XR left tib/fib in PACU, OBTAIN IMAGES WITH BOOT OFF  Dressing: Dressing change POD 2  Bracing/Splinting: Tall boot left leg, remove boot and ACE wrap twice daily for skin checks      I positioned the patient. I placed and held retractors to provide adequate exposure that allowed the case to proceed in a safe, efficient manner. I assisted in identifying and protecting important structures. I suctioned fluids when needed. I assisted in positioning and holding the operative extremity in proper positioning during surgery to allow the surgeon to perform various steps of the case. I assisted with the proper selection and positioning of any implants  required for the case. I performed wound closure of the operative incisions.    An experienced physician assistant was medically necessary to ensure a safe and efficient procedure. The assistance that I provided decreased operative time and thereby, reduced the risk of infection and complications from prolonged time of anesthesia. My assistance was vital in achieving best practices.    No qualified residents or fellows were available to assist with this case.      CAROLE Gao PA-C  Physician Assistant, Orthopedic Surgery  Pager: [phone]

## 2020-12-30 NOTE — PROGRESS NOTES
Pt NPO since midnight.  Christiano bath complete.  Pre op checklist complete.  Pt thought she signed a consent last week, no consent in chart.  Pt ready for surgery.  Pt left unit at 1140.  Report called to 3C.

## 2020-12-30 NOTE — ANESTHESIA CARE TRANSFER NOTE
Patient: Moni Molina    Procedure(s):  OPEN REDUCTION INTERNAL FIXATION, FRACTURE, TIBIA    Diagnosis: Tibia/fibula fracture, left, closed, initial encounter [S82.202A, S82.402A]  Diagnosis Additional Information: No value filed.    Anesthesia Type:   General     Note:  Airway :Face Mask  Patient transferred to:PACU  Comments: Pt alert, breathing spontaneously on 6L O2 via FM. VSS. Report shared with RN.Handoff Report: Identifed the Patient, Identified the Reponsible Provider, Reviewed the pertinent medical history, Discussed the surgical course, Reviewed Intra-OP anesthesia mangement and issues during anesthesia, Set expectations for post-procedure period and Allowed opportunity for questions and acknowledgement of understanding      Vitals: (Last set prior to Anesthesia Care Transfer)    CRNA VITALS  12/30/2020 1613 - 12/30/2020 1650      12/30/2020             NIBP:  130/64    NIBP Mean:  93    Ht Rate:  89    SpO2:  100 %                Electronically Signed By: HAYDEN Hoskins CRNA  December 30, 2020  4:50 PM

## 2020-12-30 NOTE — PROGRESS NOTES
Post surgical xrays being done at [id number], Moni had much more severe pain with removal of boot, placement of xray boards and replacement of boot. IV fentanyl and dilaudid given accordingly (see MAR for details).    Dr Maloney paged to notify of postop xrays complete, Dr Mckay on call for ortho also paged to notify.
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
## 2020-12-30 NOTE — PLAN OF CARE
Alert, oriented, up with SBA to bedside commode and chair.  Able to get some sleep. IV dilaudid 1x before bed, this morning declined tylenol or other pain meds. Currently sitting in chair with LLE elevated. Relieved that surgery will be today.  NPO since 0000.   Voiding good amts.  Hep gtt stopped at 0000.  PLAN: OR today for open reduction internal fixation tibia.

## 2020-12-30 NOTE — PLAN OF CARE
AVSS on room air. A+Ox4. Pivioting SBA to recliner & bedside commode. Pain controlled with oxy & IV dilaudid for break-through pain. LLE ACE wrap/splint CDI, pt non-wt bearing on left leg. Tolerating regular diet w/o nausea or vomiting- plan to be NPO @ midnight for ortho surgery tomorrow. LBM 12/28. Voiding spontaneously in adequate amts. Heparin gtt @ 1100 units/hr, please stop gtt @ midnight per new provider orders. Will continue with POC.
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
## 2020-12-30 NOTE — ANESTHESIA PROCEDURE NOTES
Airway   Date/Time: 12/30/2020 1:37 PM   Patient location during procedure: OR    Staff -   CRNA: Juhi Sotelo APRN CRNA  Performed By: CRNA    Consent for Airway   Urgency: elective    Indications and Patient Condition  Indications for airway management: bud-procedural  Induction type:intravenousMask difficulty assessment: 1 - vent by mask    Final Airway Details  Final airway type: endotracheal airway  Successful airway:ETT - single  Endotracheal Airway Details   ETT size (mm): 7.0  Cuffed: yes  Successful intubation technique: direct laryngoscopy  Grade View of Cords: 1  Adjucts: stylet  Measured from: lips  Secured at (cm): 22  Secured with: pink tape  Bite block used: Soft    Post intubation assessment   Placement verified by: capnometry, equal breath sounds and chest rise   Number of attempts at approach: 1  Secured with:pink tape  Ease of procedure: easy  Dentition: Unchanged and Intact

## 2020-12-31 ENCOUNTER — APPOINTMENT (OUTPATIENT)
Dept: PHYSICAL THERAPY | Facility: CLINIC | Age: 45
DRG: 982 | End: 2020-12-31
Payer: COMMERCIAL

## 2020-12-31 LAB
ANION GAP SERPL CALCULATED.3IONS-SCNC: 7 MMOL/L (ref 3–14)
BUN SERPL-MCNC: 13 MG/DL (ref 7–30)
CALCIUM SERPL-MCNC: 9.6 MG/DL (ref 8.5–10.1)
CHLORIDE SERPL-SCNC: 101 MMOL/L (ref 94–109)
CO2 SERPL-SCNC: 23 MMOL/L (ref 20–32)
CREAT SERPL-MCNC: 0.78 MG/DL (ref 0.52–1.04)
ERYTHROCYTE [DISTWIDTH] IN BLOOD BY AUTOMATED COUNT: 13.7 % (ref 10–15)
GFR SERPL CREATININE-BSD FRML MDRD: >90 ML/MIN/{1.73_M2}
GLUCOSE SERPL-MCNC: 101 MG/DL (ref 70–99)
HCT VFR BLD AUTO: 34 % (ref 35–47)
HGB BLD-MCNC: 10.8 G/DL (ref 11.7–15.7)
MCH RBC QN AUTO: 27.1 PG (ref 26.5–33)
MCHC RBC AUTO-ENTMCNC: 31.8 G/DL (ref 31.5–36.5)
MCV RBC AUTO: 85 FL (ref 78–100)
PLATELET # BLD AUTO: 290 10E9/L (ref 150–450)
POTASSIUM SERPL-SCNC: 4.2 MMOL/L (ref 3.4–5.3)
RBC # BLD AUTO: 3.99 10E12/L (ref 3.8–5.2)
SODIUM SERPL-SCNC: 131 MMOL/L (ref 133–144)
UFH PPP CHRO-ACNC: 0.34 IU/ML
WBC # BLD AUTO: 9.9 10E9/L (ref 4–11)

## 2020-12-31 PROCEDURE — 999N000128 HC STATISTIC PERIPHERAL IV START W/O US GUIDANCE

## 2020-12-31 PROCEDURE — 80048 BASIC METABOLIC PNL TOTAL CA: CPT | Performed by: STUDENT IN AN ORGANIZED HEALTH CARE EDUCATION/TRAINING PROGRAM

## 2020-12-31 PROCEDURE — 250N000013 HC RX MED GY IP 250 OP 250 PS 637: Performed by: STUDENT IN AN ORGANIZED HEALTH CARE EDUCATION/TRAINING PROGRAM

## 2020-12-31 PROCEDURE — 85520 HEPARIN ASSAY: CPT | Performed by: STUDENT IN AN ORGANIZED HEALTH CARE EDUCATION/TRAINING PROGRAM

## 2020-12-31 PROCEDURE — 99233 SBSQ HOSP IP/OBS HIGH 50: CPT | Mod: GC | Performed by: HOSPITALIST

## 2020-12-31 PROCEDURE — 85027 COMPLETE CBC AUTOMATED: CPT | Performed by: STUDENT IN AN ORGANIZED HEALTH CARE EDUCATION/TRAINING PROGRAM

## 2020-12-31 PROCEDURE — 97530 THERAPEUTIC ACTIVITIES: CPT | Mod: GP

## 2020-12-31 PROCEDURE — 97164 PT RE-EVAL EST PLAN CARE: CPT | Mod: GP

## 2020-12-31 PROCEDURE — 120N000002 HC R&B MED SURG/OB UMMC

## 2020-12-31 PROCEDURE — 36415 COLL VENOUS BLD VENIPUNCTURE: CPT | Performed by: STUDENT IN AN ORGANIZED HEALTH CARE EDUCATION/TRAINING PROGRAM

## 2020-12-31 PROCEDURE — 250N000011 HC RX IP 250 OP 636: Performed by: STUDENT IN AN ORGANIZED HEALTH CARE EDUCATION/TRAINING PROGRAM

## 2020-12-31 RX ORDER — OXYCODONE HYDROCHLORIDE 5 MG/1
5-10 TABLET ORAL EVERY 6 HOURS PRN
Status: DISCONTINUED | OUTPATIENT
Start: 2020-12-31 | End: 2020-12-31

## 2020-12-31 RX ORDER — OXYCODONE HYDROCHLORIDE 5 MG/1
5-10 TABLET ORAL EVERY 4 HOURS PRN
Status: DISCONTINUED | OUTPATIENT
Start: 2020-12-31 | End: 2021-01-04 | Stop reason: HOSPADM

## 2020-12-31 RX ADMIN — OXYCODONE HYDROCHLORIDE 10 MG: 5 TABLET ORAL at 18:50

## 2020-12-31 RX ADMIN — OXYCODONE HYDROCHLORIDE 5 MG: 5 TABLET ORAL at 10:17

## 2020-12-31 RX ADMIN — HEPARIN SODIUM 1100 UNITS/HR: 10000 INJECTION, SOLUTION INTRAVENOUS at 10:30

## 2020-12-31 RX ADMIN — DOCUSATE SODIUM AND SENNOSIDES 2 TABLET: 8.6; 5 TABLET, FILM COATED ORAL at 20:24

## 2020-12-31 RX ADMIN — ACETAMINOPHEN 975 MG: 325 TABLET, FILM COATED ORAL at 14:09

## 2020-12-31 RX ADMIN — CETIRIZINE HYDROCHLORIDE 10 MG: 5 TABLET ORAL at 23:57

## 2020-12-31 RX ADMIN — HYDROMORPHONE HYDROCHLORIDE 0.3 MG: 1 INJECTION, SOLUTION INTRAMUSCULAR; INTRAVENOUS; SUBCUTANEOUS at 00:33

## 2020-12-31 RX ADMIN — HYDROMORPHONE HYDROCHLORIDE 0.3 MG: 1 INJECTION, SOLUTION INTRAMUSCULAR; INTRAVENOUS; SUBCUTANEOUS at 20:24

## 2020-12-31 RX ADMIN — ACETAMINOPHEN 975 MG: 325 TABLET, FILM COATED ORAL at 05:58

## 2020-12-31 RX ADMIN — ACETAMINOPHEN 975 MG: 325 TABLET, FILM COATED ORAL at 23:57

## 2020-12-31 RX ADMIN — HYDROMORPHONE HYDROCHLORIDE 0.3 MG: 1 INJECTION, SOLUTION INTRAMUSCULAR; INTRAVENOUS; SUBCUTANEOUS at 04:26

## 2020-12-31 RX ADMIN — POLYETHYLENE GLYCOL 3350 17 G: 17 POWDER, FOR SOLUTION ORAL at 08:28

## 2020-12-31 RX ADMIN — HYDROMORPHONE HYDROCHLORIDE 0.3 MG: 1 INJECTION, SOLUTION INTRAMUSCULAR; INTRAVENOUS; SUBCUTANEOUS at 13:55

## 2020-12-31 RX ADMIN — OXYCODONE HYDROCHLORIDE 10 MG: 5 TABLET ORAL at 14:43

## 2020-12-31 RX ADMIN — DOCUSATE SODIUM AND SENNOSIDES 2 TABLET: 8.6; 5 TABLET, FILM COATED ORAL at 08:27

## 2020-12-31 RX ADMIN — OXYCODONE HYDROCHLORIDE 5 MG: 5 TABLET ORAL at 03:25

## 2020-12-31 ASSESSMENT — ACTIVITIES OF DAILY LIVING (ADL)
ADLS_ACUITY_SCORE: 18

## 2020-12-31 NOTE — PLAN OF CARE
[id number]    Neuro: A&O x4.  Very pleasant, calm and cooperative  Pain: PRN oxycodone given x2. IV Dilaudid x3.    Respiratory: O2>95% on 2L NC   Activity: Non weight bearing on left foot.  Was using bedpan overnight with 2 assist   Diet: Regular  GI/: Denies nausea.  Voided 800 ml. Not passing gas.   Lines/Drains:  2 R PIV SL   Incisions: Right lower leg, wrapped in ACE bandage with boot over, CDI.   Neurovascular:  Denies numbness and tingling in all extremities. Able to wiggle toes in L foot, toes are warm.

## 2020-12-31 NOTE — PROGRESS NOTES
Spoke with Olivia Velazquez and per MD not starting back on anticoagulation prophylaxis until tomorrow am, which will be POD 1.  Holding off on IV Fluids for now. Regular diet was resumed. Will encourage fluids.   Will continue to monitor.   TYREL ONTIVEROS RN on 12/30/2020 at 7:49 PM

## 2020-12-31 NOTE — PLAN OF CARE
"/76 (BP Location: Left arm)   Pulse 97   Temp 98.6  F (37  C) (Oral)   Resp 17   Ht 1.702 m (5' 7\")   Wt 124 kg (273 lb 5.9 oz)   SpO2 98%   Breastfeeding No   BMI 42.82 kg/m      Neuro: A&Ox4.   Cardiac: AVSS. /70's. HR 97    Respiratory: Sating 96% on RA.  GI/: Adequate urine output. No BM  Diet/appetite: Tolerating diet. Eats food from home in pt's fridge. Doesn't want hospital food  Activity:  Assist of 1-2 to bedside commode. Sat up in the recliner  Pain: LLE pain managed with oxycodone and iv dilaudid   Skin: LLE in ace wrap. Tall boot in place. Doppler positive   LDA's: PIV x2     Restarted heparin drip at 1100units/hr at 10;:30am. Heparin 10a check due at 4pm. Scheduled in Epic    Plan: Continue with POC. Notify primary team with changes.      "

## 2020-12-31 NOTE — OP NOTE
Procedure Date: 12/30/2020      PREOPERATIVE DIAGNOSES:  Left tibia and fibula fracture.      POSTOPERATIVE DIAGNOSES:  Left tibia and fibula fracture.      PROCEDURES PERFORMED:  Left tibia and fibula open reduction, internal fixation by placement of an intramedullary nail.      SURGEON:  Damion Maloney MD      ASSISTANT:  Checo Hunt, orthopedic PA-C  Mr. Hunt's assistance was required in order to provide assistance with positioning, the surgery itself, holding retractors, closure of the wound and application of immobilization devices.  At the time of the surgery, there was no available help from an orthopedic trainee.    COMPLICATIONS:  None.      DRAINS:  None.      ESTIMATED BLOOD LOSS:  50 mL      ANESTHESIA:  General endotracheal.      INDICATIONS FOR PROCEDURE:  Please refer to hospital chart for further details regarding indications for Ms. Molina's case.      DESCRIPTION OF PROCEDURE:  On 12/30/2020, patient was taken to surgery.  Preoperative antibiotics were administered to the patient prior to arrival to the OR.      After successful induction of general endotracheal anesthesia, she was placed supine on the operating table.  The left lower extremity was prepped and draped in a sterile fashion.  After exsanguination by gravity, the tourniquet cuff was inflated to 300 mmHg on the proximal third of the left thigh.      The pause for the cause was performed according to our institution's policy, which confirmed laterality of the procedure.      We proceeded with an incision on the level of the fracture site.  This was done along the most anterolateral portion of the tibia.  This also proceeded with some manipulation of the fracture and multiple attempts were created in order to provide reduction with a bone clamp.  However, the transverse features of the fracture and the comminution anteriorly was making the reduction close to impossible to be held with a clamp.  Therefore, we decided to  maintain the reduction manually while proceeding with debridement.      Another incision was made on the proximal portion of the knee medial to the patellar tendon.  Subcutaneous tissues were dissected.  The retinaculum was incised.  Under fluoroscopic control, we identify the entry point for the proximal tibia.  These were followed by placement of a guidewire, paying special attention to be in a center-center position on the distal fragment.  This eventually was reamed to 12.5 mm and we proceeded with placement of an 11 x 345 mm tibia Aaron nail.      Once we confirmed to have excellent alignment, we proceeded with placement of 2 distal locking screws and 1 proximal screw through the dynamic hole.  Excellent purchase for all screws was noticed.  We confirmed with fluoroscopic examination AP and lateral projections to have excellent location of the hardware as well as reduction of the fracture fragments.  These images were sent to PACS for definitive documentation.      The tourniquet cuff was deflated.  Satisfactory hemostasis was accomplished.  The wound was closed in layers.  Sterile dressings were applied.  The patient was placed in a toe CAM walker and transferred in stable condition to PACU.      PLAN:  The patient will remain nonweightbearing x6 weeks.  She will maintain the CAM walker at all times except for hygiene for the first 2 weeks from surgery.  She will proceed with wound checks and skin checks while being in the hospital.      At the 2-week appointment, sutures will be removed if indicated.  She will proceed with physical therapy without restrictions with an emphasis on knee range of motion exercises as well as ankle range of motion exercises.      The CAM walker will be utilized at all times except for hygiene, resting, sitting and sleeping activities between weeks 2 and 6.      At the 6-week appointment, 2 views of the left tibia will be obtained and based on those findings, further  recommendations will be given to the patient.      Anticoagulation therapy will be maintained for the patient based on recommendations by Hem/Onc given the severity of her pulmonary embolism and clots.            MICHAEL PARTIDA MD             D: 2020   T: 2020   MT: CURT      Name:     BEE GORMAN   MRN:      [id number]        Account:        [id number]   :      1975           Procedure Date: 2020      Document: [id number]
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
## 2020-12-31 NOTE — PLAN OF CARE
Pt arrived from PACU at 1819. Pt lethargic and calm. VSS on 2L NC. Denies nausea. LLE with boot and ace wrap in place. Pt reports no numbness and tingling in bilateral lower extremities. Capillary refill equal bilaterally. Pain in LLE 8/10, pt resting and still very sleepy. Notified primary team that patient is back from surgery and asked plan on pt anticoagulation prophylaxis and if they want pt to have continuous IV fluids. Awaiting response. Pt resting. Continue to monitor.

## 2021-01-01 LAB
ANION GAP SERPL CALCULATED.3IONS-SCNC: 9 MMOL/L (ref 3–14)
BUN SERPL-MCNC: 13 MG/DL (ref 7–30)
CALCIUM SERPL-MCNC: 9.5 MG/DL (ref 8.5–10.1)
CHLORIDE SERPL-SCNC: 103 MMOL/L (ref 94–109)
CO2 SERPL-SCNC: 22 MMOL/L (ref 20–32)
CREAT SERPL-MCNC: 0.76 MG/DL (ref 0.52–1.04)
ERYTHROCYTE [DISTWIDTH] IN BLOOD BY AUTOMATED COUNT: 13.6 % (ref 10–15)
GFR SERPL CREATININE-BSD FRML MDRD: >90 ML/MIN/{1.73_M2}
GLUCOSE SERPL-MCNC: 88 MG/DL (ref 70–99)
HCT VFR BLD AUTO: 34.5 % (ref 35–47)
HGB BLD-MCNC: 11.1 G/DL (ref 11.7–15.7)
MCH RBC QN AUTO: 27.8 PG (ref 26.5–33)
MCHC RBC AUTO-ENTMCNC: 32.2 G/DL (ref 31.5–36.5)
MCV RBC AUTO: 86 FL (ref 78–100)
PLATELET # BLD AUTO: 259 10E9/L (ref 150–450)
POTASSIUM SERPL-SCNC: 4.1 MMOL/L (ref 3.4–5.3)
RBC # BLD AUTO: 4 10E12/L (ref 3.8–5.2)
SODIUM SERPL-SCNC: 134 MMOL/L (ref 133–144)
UFH PPP CHRO-ACNC: 0.45 IU/ML
WBC # BLD AUTO: 10.1 10E9/L (ref 4–11)

## 2021-01-01 PROCEDURE — 36415 COLL VENOUS BLD VENIPUNCTURE: CPT | Performed by: STUDENT IN AN ORGANIZED HEALTH CARE EDUCATION/TRAINING PROGRAM

## 2021-01-01 PROCEDURE — 85027 COMPLETE CBC AUTOMATED: CPT | Performed by: STUDENT IN AN ORGANIZED HEALTH CARE EDUCATION/TRAINING PROGRAM

## 2021-01-01 PROCEDURE — 120N000002 HC R&B MED SURG/OB UMMC

## 2021-01-01 PROCEDURE — 250N000013 HC RX MED GY IP 250 OP 250 PS 637: Performed by: STUDENT IN AN ORGANIZED HEALTH CARE EDUCATION/TRAINING PROGRAM

## 2021-01-01 PROCEDURE — 99233 SBSQ HOSP IP/OBS HIGH 50: CPT | Mod: GC | Performed by: HOSPITALIST

## 2021-01-01 PROCEDURE — 85520 HEPARIN ASSAY: CPT | Performed by: HOSPITALIST

## 2021-01-01 PROCEDURE — 36415 COLL VENOUS BLD VENIPUNCTURE: CPT | Performed by: HOSPITALIST

## 2021-01-01 PROCEDURE — 80048 BASIC METABOLIC PNL TOTAL CA: CPT | Performed by: STUDENT IN AN ORGANIZED HEALTH CARE EDUCATION/TRAINING PROGRAM

## 2021-01-01 PROCEDURE — 250N000011 HC RX IP 250 OP 636: Performed by: STUDENT IN AN ORGANIZED HEALTH CARE EDUCATION/TRAINING PROGRAM

## 2021-01-01 RX ADMIN — POLYETHYLENE GLYCOL 3350 17 G: 17 POWDER, FOR SOLUTION ORAL at 07:40

## 2021-01-01 RX ADMIN — OXYCODONE HYDROCHLORIDE 10 MG: 5 TABLET ORAL at 12:57

## 2021-01-01 RX ADMIN — HYDROMORPHONE HYDROCHLORIDE 0.3 MG: 1 INJECTION, SOLUTION INTRAMUSCULAR; INTRAVENOUS; SUBCUTANEOUS at 07:40

## 2021-01-01 RX ADMIN — ACETAMINOPHEN 975 MG: 325 TABLET, FILM COATED ORAL at 07:40

## 2021-01-01 RX ADMIN — RIVAROXABAN 15 MG: 15 TABLET, FILM COATED ORAL at 14:05

## 2021-01-01 RX ADMIN — OXYCODONE HYDROCHLORIDE 10 MG: 5 TABLET ORAL at 04:21

## 2021-01-01 RX ADMIN — RIVAROXABAN 15 MG: 15 TABLET, FILM COATED ORAL at 21:27

## 2021-01-01 RX ADMIN — OXYCODONE HYDROCHLORIDE 10 MG: 5 TABLET ORAL at 00:34

## 2021-01-01 RX ADMIN — HYDROMORPHONE HYDROCHLORIDE 0.3 MG: 1 INJECTION, SOLUTION INTRAMUSCULAR; INTRAVENOUS; SUBCUTANEOUS at 00:00

## 2021-01-01 RX ADMIN — HYDROMORPHONE HYDROCHLORIDE 0.3 MG: 1 INJECTION, SOLUTION INTRAMUSCULAR; INTRAVENOUS; SUBCUTANEOUS at 22:10

## 2021-01-01 RX ADMIN — Medication 1 DROP: at 19:26

## 2021-01-01 RX ADMIN — HEPARIN SODIUM 1100 UNITS/HR: 10000 INJECTION, SOLUTION INTRAVENOUS at 08:05

## 2021-01-01 RX ADMIN — OXYCODONE HYDROCHLORIDE 10 MG: 5 TABLET ORAL at 17:16

## 2021-01-01 RX ADMIN — HYDROMORPHONE HYDROCHLORIDE 0.3 MG: 1 INJECTION, SOLUTION INTRAMUSCULAR; INTRAVENOUS; SUBCUTANEOUS at 19:07

## 2021-01-01 RX ADMIN — CETIRIZINE HYDROCHLORIDE 10 MG: 5 TABLET ORAL at 21:26

## 2021-01-01 RX ADMIN — OXYCODONE HYDROCHLORIDE 10 MG: 5 TABLET ORAL at 21:32

## 2021-01-01 RX ADMIN — ACETAMINOPHEN 975 MG: 325 TABLET, FILM COATED ORAL at 13:00

## 2021-01-01 RX ADMIN — OXYCODONE HYDROCHLORIDE 10 MG: 5 TABLET ORAL at 08:25

## 2021-01-01 RX ADMIN — ACETAMINOPHEN 975 MG: 325 TABLET, FILM COATED ORAL at 21:26

## 2021-01-01 RX ADMIN — DOCUSATE SODIUM AND SENNOSIDES 2 TABLET: 8.6; 5 TABLET, FILM COATED ORAL at 07:40

## 2021-01-01 ASSESSMENT — ACTIVITIES OF DAILY LIVING (ADL)
ADLS_ACUITY_SCORE: 18

## 2021-01-01 NOTE — PLAN OF CARE
"Blood pressure (!) 140/89, pulse 98, temperature 99  F (37.2  C), temperature source Oral, resp. rate 17, height 1.702 m (5' 7\"), weight 124 kg (273 lb 5.9 oz), SpO2 98 %, not currently breastfeeding.    Patient care [id number]  A/O x 4. Oxycodone 10 mg PRN managed Left leg pain . Good po intake. LLE  incision covered with dressing and ACE wraps. + Doppler. +CMS. LLE NWB, Boots in place. Up with A2/GB/walker pivot to bedside commode. PIV infusing Heparin gtt 11cc/hr.Hep 10 recheck tomorrow AM. Appropriate with call light. Continue to monitor for symptoms of bleeding. Follow POC and update primary team with any concerns or changes.    Problem: Adult Inpatient Plan of Care  Goal: Absence of Hospital-Acquired Illness or Injury  Intervention: Identify and Manage Fall Risk  Recent Flowsheet Documentation  Taken 12/31/2020 1600 by Margarita Joseph RN  Safety Promotion/Fall Prevention:    activity supervised    fall prevention program maintained    nonskid shoes/slippers when out of bed     Problem: Adult Inpatient Plan of Care  Goal: Absence of Hospital-Acquired Illness or Injury  Intervention: Prevent Skin Injury  Recent Flowsheet Documentation  Taken 12/31/2020 1600 by Margarita Joseph RN  Body Position: position changed independently     "

## 2021-01-01 NOTE — PLAN OF CARE
POD 2. AVSS on RA, afebrile. Pian managed with IV Dilaudid x1, Oxycodone and Tylenol. Denies nausea, regular diet prefers food in fridge. Up with 1-2 assist to commode today. Adequate UOP. Passing gas, no BM this shift. NWB on LLE.  LLE wrapped, + pulse, warm, no numbness or tingling, good cap refill. Able to wiggle toes. LLE elevated and boot in place.   Continue POC
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
## 2021-01-01 NOTE — PLAN OF CARE
POD 2 repair of tib/fib fracture, hx PE and DVT.  Alert, oriented, not out of bed overnight. Yesterday used bedpan and bedside commode, Ax2/W.  NWB on LLE.  LLE wrapped, + pulse, warm, no numbness or tingling, good cap refill. Able to wiggle toes. LLE elevated. Pain managed with oxycodone, tylenol, IV dilaudid.  Tolerating diet, pt's preferred food is in frig.  Denies nausea.  Hep gtt at 1100 units/hr, Hep 10a recheck this morning.    Update 0700-   MD at bedside, removed ace wrap from LLE and changed dressings.  Dressings and wrap were quite bloody. Dried blood was cleaned up by RN. Foot has 3+ edema.  Per MD request, 6 inch ace wraps were ordered and he asked that LLE be wrapped with 2 of them to reduce swelling.   He told patient and RN that the boot should be worn while in bed/at night to prevent foot drop but that it can be removed for range of motion exercises.
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
## 2021-01-02 ENCOUNTER — APPOINTMENT (OUTPATIENT)
Dept: OCCUPATIONAL THERAPY | Facility: CLINIC | Age: 46
DRG: 982 | End: 2021-01-02
Payer: COMMERCIAL

## 2021-01-02 LAB
ERYTHROCYTE [DISTWIDTH] IN BLOOD BY AUTOMATED COUNT: 13.5 % (ref 10–15)
HCT VFR BLD AUTO: 31.6 % (ref 35–47)
HGB BLD-MCNC: 9.8 G/DL (ref 11.7–15.7)
MCH RBC QN AUTO: 26.5 PG (ref 26.5–33)
MCHC RBC AUTO-ENTMCNC: 31 G/DL (ref 31.5–36.5)
MCV RBC AUTO: 85 FL (ref 78–100)
PLATELET # BLD AUTO: 283 10E9/L (ref 150–450)
RBC # BLD AUTO: 3.7 10E12/L (ref 3.8–5.2)
UFH PPP CHRO-ACNC: >1.1 IU/ML
WBC # BLD AUTO: 8.2 10E9/L (ref 4–11)

## 2021-01-02 PROCEDURE — 120N000002 HC R&B MED SURG/OB UMMC

## 2021-01-02 PROCEDURE — 250N000013 HC RX MED GY IP 250 OP 250 PS 637: Performed by: STUDENT IN AN ORGANIZED HEALTH CARE EDUCATION/TRAINING PROGRAM

## 2021-01-02 PROCEDURE — 99233 SBSQ HOSP IP/OBS HIGH 50: CPT | Performed by: HOSPITALIST

## 2021-01-02 PROCEDURE — 85520 HEPARIN ASSAY: CPT | Performed by: HOSPITALIST

## 2021-01-02 PROCEDURE — 97535 SELF CARE MNGMENT TRAINING: CPT | Mod: GO | Performed by: OCCUPATIONAL THERAPIST

## 2021-01-02 PROCEDURE — 36415 COLL VENOUS BLD VENIPUNCTURE: CPT | Performed by: HOSPITALIST

## 2021-01-02 PROCEDURE — 85027 COMPLETE CBC AUTOMATED: CPT | Performed by: HOSPITALIST

## 2021-01-02 PROCEDURE — 97165 OT EVAL LOW COMPLEX 30 MIN: CPT | Mod: GO | Performed by: OCCUPATIONAL THERAPIST

## 2021-01-02 RX ADMIN — CETIRIZINE HYDROCHLORIDE 10 MG: 5 TABLET ORAL at 21:58

## 2021-01-02 RX ADMIN — ACETAMINOPHEN 975 MG: 325 TABLET, FILM COATED ORAL at 21:58

## 2021-01-02 RX ADMIN — Medication 1 DROP: at 22:02

## 2021-01-02 RX ADMIN — RIVAROXABAN 15 MG: 15 TABLET, FILM COATED ORAL at 08:16

## 2021-01-02 RX ADMIN — POLYETHYLENE GLYCOL 3350 17 G: 17 POWDER, FOR SOLUTION ORAL at 08:16

## 2021-01-02 RX ADMIN — OXYCODONE HYDROCHLORIDE 10 MG: 5 TABLET ORAL at 12:25

## 2021-01-02 RX ADMIN — OXYCODONE HYDROCHLORIDE 10 MG: 5 TABLET ORAL at 19:23

## 2021-01-02 RX ADMIN — OXYCODONE HYDROCHLORIDE 10 MG: 5 TABLET ORAL at 06:58

## 2021-01-02 RX ADMIN — DOCUSATE SODIUM AND SENNOSIDES 2 TABLET: 8.6; 5 TABLET, FILM COATED ORAL at 08:16

## 2021-01-02 RX ADMIN — OXYCODONE HYDROCHLORIDE 10 MG: 5 TABLET ORAL at 02:13

## 2021-01-02 RX ADMIN — ACETAMINOPHEN 975 MG: 325 TABLET, FILM COATED ORAL at 14:04

## 2021-01-02 RX ADMIN — ACETAMINOPHEN 975 MG: 325 TABLET, FILM COATED ORAL at 08:16

## 2021-01-02 RX ADMIN — RIVAROXABAN 15 MG: 15 TABLET, FILM COATED ORAL at 17:51

## 2021-01-02 RX ADMIN — OXYCODONE HYDROCHLORIDE 10 MG: 5 TABLET ORAL at 23:23

## 2021-01-02 ASSESSMENT — ACTIVITIES OF DAILY LIVING (ADL)
ADLS_ACUITY_SCORE: 18
PREVIOUS_RESPONSIBILITIES: MEAL PREP;HOUSEKEEPING;LAUNDRY;SHOPPING;MEDICATION MANAGEMENT;FINANCES;DRIVING
ADLS_ACUITY_SCORE: 17
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
## 2021-01-02 NOTE — PLAN OF CARE
POD 3. AVSS on RA, afebrile. Pain managed with Oxycodone and Tylenol. Denies nausea, on a regular diet. Up with 1 assist to commode and chair today. Worked with OT today and walked to the door with GB and walker. Would like to try walking to BR instead of commode now. OT recommended 2A to BR for the first. Adequate UOP. Passing gas, BMx1 this shift. PIV x2 sl'd. NWB on LLE. LLE wrapped, + pulse, warm, no numbness or tingling, good cap refill. Able to wiggle toes. LLE elevated and boot in place.  Continue POC
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
## 2021-01-02 NOTE — PLAN OF CARE
AVSS on room air. A+Ox4. Pivoting to commode as tolerated, preferring to use bedpan. Pain managed w/ oxy q4h, dilaudid, and scheduled tylenol. Voiding in adequate amts. Passing gas, no BM this shift. LLE wrapped, doppler pulses present, skin warm, good capillary refil. Able to wiggle toes. LLE elevated and boot in place. Will continue with POC.
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
## 2021-01-02 NOTE — PLAN OF CARE
AVSS.  98% RA.    LLE pain continues, oxycodone PRN with relief.    Up to BSComm with assist 1-2.  LLE non weight bearing.    LLE ACE wrapped, in brace.  + doppler pulses.  Denies numbness, tingling.  Wiggles toes, edema.     Pt also washed up while sitting up.  Voiding spont, good vols.  No BM.  PIV SL.  Cont with POC.

## 2021-01-03 ENCOUNTER — APPOINTMENT (OUTPATIENT)
Dept: PHYSICAL THERAPY | Facility: CLINIC | Age: 46
DRG: 982 | End: 2021-01-03
Payer: COMMERCIAL

## 2021-01-03 ENCOUNTER — APPOINTMENT (OUTPATIENT)
Dept: OCCUPATIONAL THERAPY | Facility: CLINIC | Age: 46
DRG: 982 | End: 2021-01-03
Payer: COMMERCIAL

## 2021-01-03 LAB
ANION GAP SERPL CALCULATED.3IONS-SCNC: 6 MMOL/L (ref 3–14)
BUN SERPL-MCNC: 14 MG/DL (ref 7–30)
CALCIUM SERPL-MCNC: 9.7 MG/DL (ref 8.5–10.1)
CHLORIDE SERPL-SCNC: 106 MMOL/L (ref 94–109)
CO2 SERPL-SCNC: 24 MMOL/L (ref 20–32)
CREAT SERPL-MCNC: 0.79 MG/DL (ref 0.52–1.04)
ERYTHROCYTE [DISTWIDTH] IN BLOOD BY AUTOMATED COUNT: 13.4 % (ref 10–15)
GFR SERPL CREATININE-BSD FRML MDRD: >90 ML/MIN/{1.73_M2}
GLUCOSE SERPL-MCNC: 85 MG/DL (ref 70–99)
HCT VFR BLD AUTO: 31.1 % (ref 35–47)
HGB BLD-MCNC: 9.7 G/DL (ref 11.7–15.7)
MCH RBC QN AUTO: 26.8 PG (ref 26.5–33)
MCHC RBC AUTO-ENTMCNC: 31.2 G/DL (ref 31.5–36.5)
MCV RBC AUTO: 86 FL (ref 78–100)
PLATELET # BLD AUTO: 319 10E9/L (ref 150–450)
POTASSIUM SERPL-SCNC: 4 MMOL/L (ref 3.4–5.3)
RBC # BLD AUTO: 3.62 10E12/L (ref 3.8–5.2)
SODIUM SERPL-SCNC: 136 MMOL/L (ref 133–144)
WBC # BLD AUTO: 7.2 10E9/L (ref 4–11)

## 2021-01-03 PROCEDURE — 97530 THERAPEUTIC ACTIVITIES: CPT | Mod: GP | Performed by: REHABILITATION PRACTITIONER

## 2021-01-03 PROCEDURE — 97535 SELF CARE MNGMENT TRAINING: CPT | Mod: GO

## 2021-01-03 PROCEDURE — 97116 GAIT TRAINING THERAPY: CPT | Mod: GP | Performed by: REHABILITATION PRACTITIONER

## 2021-01-03 PROCEDURE — 36415 COLL VENOUS BLD VENIPUNCTURE: CPT | Performed by: HOSPITALIST

## 2021-01-03 PROCEDURE — 97530 THERAPEUTIC ACTIVITIES: CPT | Mod: GO

## 2021-01-03 PROCEDURE — 99232 SBSQ HOSP IP/OBS MODERATE 35: CPT | Mod: GC | Performed by: HOSPITALIST

## 2021-01-03 PROCEDURE — 80048 BASIC METABOLIC PNL TOTAL CA: CPT | Performed by: HOSPITALIST

## 2021-01-03 PROCEDURE — 250N000013 HC RX MED GY IP 250 OP 250 PS 637: Performed by: STUDENT IN AN ORGANIZED HEALTH CARE EDUCATION/TRAINING PROGRAM

## 2021-01-03 PROCEDURE — 120N000002 HC R&B MED SURG/OB UMMC

## 2021-01-03 PROCEDURE — 85027 COMPLETE CBC AUTOMATED: CPT | Performed by: HOSPITALIST

## 2021-01-03 RX ADMIN — Medication 1 DROP: at 18:01

## 2021-01-03 RX ADMIN — ACETAMINOPHEN 975 MG: 325 TABLET, FILM COATED ORAL at 07:52

## 2021-01-03 RX ADMIN — OXYCODONE HYDROCHLORIDE 10 MG: 5 TABLET ORAL at 22:17

## 2021-01-03 RX ADMIN — OXYCODONE HYDROCHLORIDE 10 MG: 5 TABLET ORAL at 03:33

## 2021-01-03 RX ADMIN — CETIRIZINE HYDROCHLORIDE 10 MG: 5 TABLET ORAL at 22:17

## 2021-01-03 RX ADMIN — ACETAMINOPHEN 975 MG: 325 TABLET, FILM COATED ORAL at 13:54

## 2021-01-03 RX ADMIN — OXYCODONE HYDROCHLORIDE 10 MG: 5 TABLET ORAL at 18:01

## 2021-01-03 RX ADMIN — RIVAROXABAN 15 MG: 15 TABLET, FILM COATED ORAL at 07:53

## 2021-01-03 RX ADMIN — ACETAMINOPHEN 975 MG: 325 TABLET, FILM COATED ORAL at 22:17

## 2021-01-03 RX ADMIN — RIVAROXABAN 15 MG: 15 TABLET, FILM COATED ORAL at 17:24

## 2021-01-03 RX ADMIN — OXYCODONE HYDROCHLORIDE 10 MG: 5 TABLET ORAL at 13:53

## 2021-01-03 ASSESSMENT — ACTIVITIES OF DAILY LIVING (ADL)
ADLS_ACUITY_SCORE: 17

## 2021-01-03 NOTE — PLAN OF CARE
VSS. L leg pain managed with PRN Oxycodone and scheduled Tylenol. Ace wrap changed, brace on. Foot incision slightly edematous, doppler w/ audible pulse. Other leg incisions covered w/ gauze dressing. Pt able to ambulate with assist of 1 and walker. NWB LLE. Worked with therapy. Voiding and had BM today. Eating well. PIVs SL. Discharge plans pending home vs TCU.

## 2021-01-03 NOTE — PLAN OF CARE
POD4.    AVSS.  98% RA.  Pt resting well after repositioning LLE, edema decreased per pt.  Oxycodone x1 at 0330 - good pain control.    Denies nausea.  PIV SL x2.  Food from home, in fridge on 7A  LLE ACE wrapped, in brace/boot.  + doppler, toes warm, denies n/t.  Pt up with assist 1, walker to BR last evening.  Declined BR overnight.  LLE NWB.  Cont with POC.  Plan:  home vs. Rehab.

## 2021-01-03 NOTE — PLAN OF CARE
POD 3. AVSS. A&O x 4. PIV x 2 SL. LLE ace wrapped with orthosis brace on. Skin assessment on LLE completed. Pulse check done. Oxycodone and tylenol PRN for pain. Patient stated concern for increased swelling at incision site, but no symptoms of numbness or tingling. Reassured the patient that it is part of the healing process. Assist x 1 with walker, NWROBY LLE. Regular diet, tolerating well. Food from home, in fridge on 7A. Voids spontaneously, low output this shift, passing gas, had BM during day shift.
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
## 2021-01-04 ENCOUNTER — APPOINTMENT (OUTPATIENT)
Dept: PHYSICAL THERAPY | Facility: CLINIC | Age: 46
DRG: 982 | End: 2021-01-04
Payer: COMMERCIAL

## 2021-01-04 ENCOUNTER — PATIENT OUTREACH (OUTPATIENT)
Dept: CARE COORDINATION | Facility: CLINIC | Age: 46
End: 2021-01-04

## 2021-01-04 ENCOUNTER — APPOINTMENT (OUTPATIENT)
Dept: OCCUPATIONAL THERAPY | Facility: CLINIC | Age: 46
DRG: 982 | End: 2021-01-04
Payer: COMMERCIAL

## 2021-01-04 VITALS
TEMPERATURE: 97.8 F | HEART RATE: 96 BPM | DIASTOLIC BLOOD PRESSURE: 71 MMHG | BODY MASS INDEX: 42.91 KG/M2 | RESPIRATION RATE: 18 BRPM | SYSTOLIC BLOOD PRESSURE: 122 MMHG | OXYGEN SATURATION: 100 % | WEIGHT: 273.37 LBS | HEIGHT: 67 IN

## 2021-01-04 PROCEDURE — 97535 SELF CARE MNGMENT TRAINING: CPT | Mod: GO

## 2021-01-04 PROCEDURE — 97530 THERAPEUTIC ACTIVITIES: CPT | Mod: GP | Performed by: REHABILITATION PRACTITIONER

## 2021-01-04 PROCEDURE — 97116 GAIT TRAINING THERAPY: CPT | Mod: GP | Performed by: REHABILITATION PRACTITIONER

## 2021-01-04 PROCEDURE — 97530 THERAPEUTIC ACTIVITIES: CPT | Mod: GO

## 2021-01-04 PROCEDURE — 250N000013 HC RX MED GY IP 250 OP 250 PS 637: Performed by: STUDENT IN AN ORGANIZED HEALTH CARE EDUCATION/TRAINING PROGRAM

## 2021-01-04 PROCEDURE — 97110 THERAPEUTIC EXERCISES: CPT | Mod: GP | Performed by: REHABILITATION PRACTITIONER

## 2021-01-04 PROCEDURE — 99239 HOSP IP/OBS DSCHRG MGMT >30: CPT | Mod: GC | Performed by: HOSPITALIST

## 2021-01-04 RX ORDER — OXYCODONE HYDROCHLORIDE 5 MG/1
5-10 TABLET ORAL EVERY 6 HOURS PRN
Qty: 50 TABLET | Refills: 0 | Status: ON HOLD | OUTPATIENT
Start: 2021-01-04 | End: 2021-02-08

## 2021-01-04 RX ORDER — ACETAMINOPHEN 500 MG
1000 TABLET ORAL 3 TIMES DAILY
Qty: 84 TABLET | Refills: 0 | Status: SHIPPED | OUTPATIENT
Start: 2021-01-04 | End: 2021-01-04

## 2021-01-04 RX ORDER — POLYETHYLENE GLYCOL 3350 17 G/17G
17 POWDER, FOR SOLUTION ORAL DAILY
Qty: 238 G | Refills: 0 | Status: SHIPPED | OUTPATIENT
Start: 2021-01-04 | End: 2021-01-04

## 2021-01-04 RX ORDER — POLYETHYLENE GLYCOL 3350 17 G/17G
17 POWDER, FOR SOLUTION ORAL DAILY
Qty: 238 G | Refills: 0 | Status: SHIPPED | OUTPATIENT
Start: 2021-01-04

## 2021-01-04 RX ORDER — ACETAMINOPHEN 500 MG
1000 TABLET ORAL 3 TIMES DAILY
Qty: 84 TABLET | Refills: 0 | Status: SHIPPED | OUTPATIENT
Start: 2021-01-04

## 2021-01-04 RX ORDER — OXYCODONE HYDROCHLORIDE 5 MG/1
5-10 TABLET ORAL EVERY 6 HOURS PRN
Qty: 50 TABLET | Refills: 0 | Status: SHIPPED | OUTPATIENT
Start: 2021-01-04 | End: 2021-01-04

## 2021-01-04 RX ORDER — AMOXICILLIN 250 MG
2 CAPSULE ORAL 2 TIMES DAILY
Qty: 56 TABLET | Refills: 0 | Status: SHIPPED | OUTPATIENT
Start: 2021-01-04 | End: 2021-01-04

## 2021-01-04 RX ORDER — AMOXICILLIN 250 MG
2 CAPSULE ORAL 2 TIMES DAILY
Qty: 56 TABLET | Refills: 0 | Status: SHIPPED | OUTPATIENT
Start: 2021-01-04

## 2021-01-04 RX ADMIN — RIVAROXABAN 15 MG: 15 TABLET, FILM COATED ORAL at 17:44

## 2021-01-04 RX ADMIN — RIVAROXABAN 15 MG: 15 TABLET, FILM COATED ORAL at 08:06

## 2021-01-04 RX ADMIN — ACETAMINOPHEN 975 MG: 325 TABLET, FILM COATED ORAL at 13:28

## 2021-01-04 RX ADMIN — ACETAMINOPHEN 975 MG: 325 TABLET, FILM COATED ORAL at 08:06

## 2021-01-04 RX ADMIN — OXYCODONE HYDROCHLORIDE 10 MG: 5 TABLET ORAL at 13:29

## 2021-01-04 RX ADMIN — OXYCODONE HYDROCHLORIDE 10 MG: 5 TABLET ORAL at 17:43

## 2021-01-04 RX ADMIN — OXYCODONE HYDROCHLORIDE 10 MG: 5 TABLET ORAL at 03:49

## 2021-01-04 ASSESSMENT — ACTIVITIES OF DAILY LIVING (ADL)
ADLS_ACUITY_SCORE: 17

## 2021-01-04 NOTE — PLAN OF CARE
Pt AVSS. Pt sleeping at long intervals tonite. LLE pain rated a 2-5 during the night. Pain managed with oxycodone 10 mg. Relief obtained. Slept following. LLE elevated on 2 pillows. Doppler pulses checked with strong pulses noted. Denied any numbness or tingling in extremity. Lymphedema wrap intact. Orthotic boot on while in bed.  PIV saline locked x2. Abd obese, +bs. Lungs clear, dim in bases. Cont to maintain pain control. Monitor LLE status. Pt stated lymphedema wrap due to be replaced today.

## 2021-01-04 NOTE — PROGRESS NOTES
"CLINICAL NUTRITION SERVICES - ASSESSMENT NOTE     Nutrition Prescription    RECOMMENDATIONS FOR MDs/PROVIDERS TO ORDER:  None at this time     Malnutrition Status:    Unable to determine due to pt unavailable during attempts to interview    Recommendations already ordered by Registered Dietitian (RD):  Ordered weight    Future/Additional Recommendations:  Encourage patient to consume at least 75% of meals TID or the equivalent with snacks/supplements.  If consuming less than this offer supplements, scheduled snacks, and/or consider ordering calorie counts to assess PO intake adequacy.  Consider multivitamin with minerals if suspect PO intake inadequate.      REASON FOR ASSESSMENT  Moni Molina is a/an 45 year old female assessed by the dietitian for [facility]    NUTRITION HISTORY  Pt is POD5 from [facility] for recent tibiofibular fracture.  Has had mostly good PO intake since admission per reivew of progress notes, unable to speak with pt today due to pt unavailable/busy with other cares.     CURRENT NUTRITION ORDERS  Diet: Regular  Intake/Tolerance: tolerating regular diet with mostly good appetite since admission per review of progress notes, eating mostly % of meals documented in flowsheets.  Pt is having food brought in from home per RN notes, being stored in 7A fridge.    LABS  Labs reviewed    MEDICATIONS  Medications reviewed    ANTHROPOMETRICS  Height: 170.2 cm (5' 7\")  Most Recent Weight: 124 kg (273 lb 5.9 oz)    IBW: 61.4 kg   BMI: Obesity Grade III BMI >40  Weight History: Limited recent wt hx available, question accuracy of weight from 12/20 as much lower than other weights taken since then this admission (see below)  Wt Readings from Last 10 Encounters:   12/25/20 124 kg (273 lb 5.9 oz)   12/20/20 90.7 kg (200 lb) per Care Everywhere   07/09/20 120.5 kg (265 lb 9.6 oz) per Care Everywhere      Weights this admission  12/25/20 0222 124 kg (273 lb 5.9 oz) DN   12/24/20 0049 126 kg (277 lb 12.5 oz) AS "   12/21/20 0600 124.1 kg (273 lb 9.5 oz) PS   12/21/20 0235 113.4 kg (250 lb)        Dosing Weight: 77 kg (adjusted based on actual wt 124 kg and IBW)    ASSESSED NUTRITION NEEDS  Estimated Energy Needs: 5727-4715 kcals/day (20 - 25 kcals/kg)  Justification: Obese  Estimated Protein Needs:  grams protein/day (1.2 - 1.5 grams of pro/kg)  Justification: Post-op  Estimated Fluid Needs: 4033-9597 mL/day (25 - 30 mL/kg)   Justification: Maintenance, or other per provider pending fluid status    PHYSICAL FINDINGS  See malnutrition section below.    MALNUTRITION  % Intake: Unable to assess  % Weight Loss: Unable to assess  Subcutaneous Fat Loss: Unable to assess  Muscle Loss: Unable to assess  Fluid Accumulation/Edema: None noted per chart review today  Malnutrition Diagnosis: Unable to determine due to pt unavailable during attempts to interview    NUTRITION DIAGNOSIS  Predicted inadequate nutrient intake (protein-energy) related to mostly good appetite and eating mostly % of meals documented since admission but potential for PO intake decline with hospital LOS    INTERVENTIONS  Implementation  Nutrition Education: unable to complete due to pt unavailable    Goals  Patient to consume % of nutritionally adequate meal trays TID, or the equivalent with supplements/snacks.     Monitoring/Evaluation  Progress toward goals will be monitored and evaluated per protocol.     Belén Blanoc RD, LD  7C RD pager: [phone]

## 2021-01-04 NOTE — PLAN OF CARE
POD 4. AVSS. A&O x 4. PIV x 2 SL. LLE ace wrapped with orthosis brace on. Skin assessment on LLE completed. Pulse check done. Oxycodone and tylenol PRN for pain. Assist x 1 with walker, NWROBY LLE. Regular diet, tolerating well. Food from home, in fridge on 7A. Voids spontaneously, not measured (missed hat), passing gas, had BM during day shift.

## 2021-01-04 NOTE — PLAN OF CARE
VSS. Tylenol and Oxycodone given for L leg pain. Tolerating diet well. Voiding spontaneously. LLE doppler pulse audible. No numbness or tingling, able to move toes. Leg in ace wrap and brace, wrap changed. NWB on L leg. Using walker to ambulate. Plan for discharge home later today.

## 2021-01-04 NOTE — CONSULTS
Care Management Discharge Note    Discharge Date: 01/04/21       Discharge Disposition:  Transition to home with home care services as opposed to ARU secondary to progressing well with PT and OT.    Discharge Services:  Skilled home care as follows.      Discharge Transportation: Family will provide.    Patient/family educated on Medicare website which has current facility and service quality ratings:  Yes,    Education Provided on the Discharge Plan:  Yes  Persons Notified of Discharge Plans: Patient  Patient/Family in Agreement with the Plan:  Yes.    Handoff Referral Completed: Yes    Additional Information:  The following home care plans of care have been initiated pending insurance authorization:    Please fax discharge orders to [facility], [place]     Ph:  [phone]     Fax: [phone]     Skilled home care RN for initial home safety evaluation and 1-3 times a week to evaluate medication management, nutrition and hydration evaluation, endurance evaluation, and general status evaluation after discharge from the acute care hospital setting.     Skilled home care Physical Therapist and Occupational Therapist to evaluate and treat in home setting per recommendations of the inpatient Rehabilitation Consult Team including recommendations for equipment that will help patient in her home setting.     Patient will discharge today and home care skilled PT and OT will assist with recommendations for DME in home setting.      ROBY Baltazar.S.MO., R.N., P.H.N..  Care Coordinator     Pager   [facility]

## 2021-01-05 NOTE — PROGRESS NOTES
[phone]  Spoke with gentlemen who answered the phone  Asked me to call [facility] at a different number
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
## 2021-01-05 NOTE — PLAN OF CARE
Occupational Therapy Discharge Summary    Reason for therapy discharge:    Discharged to home with home therapy.    Progress towards therapy goal(s). See goals on Care Plan in [facility] electronic health record for goal details.  Goals partially met.  Barriers to achieving goals:   discharge from facility.    Therapy recommendation(s):    Pt presents below baseline in strength, activity tolerance, and balance leading to decreased ADL I. Recommend ARU to facilitate return to PLOF. Pt declining ARU recommendation therefore recommend home with A, FWW, tub transfer bench and  PT/OT services to maximize ADL I.

## 2021-01-05 NOTE — PLAN OF CARE
Physical Therapy Discharge Summary    Reason for therapy discharge:    Discharged to home with home therapy.    Progress towards therapy goal(s). [facility] for goal details.  Goals partially met.  Barriers to achieving goals:   discharge from facility.    Therapy recommendation(s):    Continued therapy is recommended.  Rationale/Recommendations:  home safety evaluation and progression.
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
## 2021-01-07 ENCOUNTER — NURSE TRIAGE (OUTPATIENT)
Dept: NURSING | Facility: CLINIC | Age: 46
End: 2021-01-07

## 2021-01-07 NOTE — TELEPHONE ENCOUNTER
"Pt calls in   Was seen >     OPEN REDUCTION INTERNAL FIXATION, FRACTURE, TIBIA (Left Leg)  Procedure  12/30/20  UU OR 02 / UU OR    Pt calling in now - saying she was told by ?   That \"someone \" was suppose to come to her home and check on -and do dressing changes >    Says \" NO one has come \"    Pt does have appt with her PMD tomorrow - Friday     Looking in chart I see no reference to \" in-home dressing changes\"    Advised to keep appt tomorrow and discuss with PMD tomorrow - Friday     Pt agrees     Protocol and care advice reviewed  Caller states understanding of the recommended disposition  Advised to call back if further questions or concerns    Win Mclaughlin RN / [facility]                  Reason for Disposition    [1] Caller requesting NON-URGENT health information AND [2] PCP's office is the best resource    Protocols used: INFORMATION ONLY CALL-A-AH      "

## 2021-01-12 ENCOUNTER — TELEPHONE (OUTPATIENT)
Dept: ORTHOPEDICS | Facility: CLINIC | Age: 46
End: 2021-01-12

## 2021-01-12 NOTE — PROGRESS NOTES
Reason for visit:    Moni Molina came in to the clinic for a two week post op check.    Her surgery was done 12/30/20 by Dr Maloney.  She had left tibia and fibula ORIF by nail placement     Assessment:    Moni came into the clinic in CAM walker Non-WB    The Surgical wounds were exposed and found to be well-healed and without evidence of infection; so the sutures were removed. CMS was found to be intact.    Patient presented with a very swollen foot today and in quite a bit of pain. She did request a pain medication refill which Dr. Maloney was contacted for pain management. She did not receive a wedge post operatively so a wedge was provided to her today for sufficient elevation of the lower extremity. She will be contacted by phone regarding pain management once a plan is in place.   Plan:     She was placed in CAM walker.  She was told to remain Non-WB. The patient will also begin a course of physical therapy without restrictions, except of course walking, with an emphasis on knee range of motion as well as ankle.      She has an appointment to see Dr. Maloney at that time Dr. Maloney will determine further restrictions.    She has our phone number and will call with questions or problems.

## 2021-01-12 NOTE — TELEPHONE ENCOUNTER
[facility] Call Center    Phone Message    May a detailed message be left on voicemail: yes     Reason for Call: Other: Tasia would like Pt orders for patient's Left Leg 2 x week for 2 weeks Strength, ROM and Mobility. Please call [phone]      Action Taken: Message routed to:  [facility] ([facility] Ortho    Travel Screening: Not Applicable

## 2021-01-13 ENCOUNTER — TELEPHONE (OUTPATIENT)
Dept: ORTHOPEDICS | Facility: CLINIC | Age: 46
End: 2021-01-13

## 2021-01-13 ENCOUNTER — OFFICE VISIT (OUTPATIENT)
Dept: ORTHOPEDICS | Facility: CLINIC | Age: 46
End: 2021-01-13
Payer: COMMERCIAL

## 2021-01-13 DIAGNOSIS — Z51.89 VISIT FOR WOUND CHECK: ICD-10-CM

## 2021-01-13 DIAGNOSIS — Z87.81 S/P ORIF (OPEN REDUCTION INTERNAL FIXATION) FRACTURE: Primary | ICD-10-CM

## 2021-01-13 DIAGNOSIS — S82.202A TIBIA/FIBULA FRACTURE, LEFT, CLOSED, INITIAL ENCOUNTER: Primary | ICD-10-CM

## 2021-01-13 DIAGNOSIS — S82.402A TIBIA/FIBULA FRACTURE, LEFT, CLOSED, INITIAL ENCOUNTER: Primary | ICD-10-CM

## 2021-01-13 DIAGNOSIS — Z98.890 S/P ORIF (OPEN REDUCTION INTERNAL FIXATION) FRACTURE: Primary | ICD-10-CM

## 2021-01-13 DIAGNOSIS — Z48.02 VISIT FOR SUTURE REMOVAL: ICD-10-CM

## 2021-01-13 DIAGNOSIS — Z98.890 S/P ORIF (OPEN REDUCTION INTERNAL FIXATION) FRACTURE: ICD-10-CM

## 2021-01-13 DIAGNOSIS — Z87.81 S/P ORIF (OPEN REDUCTION INTERNAL FIXATION) FRACTURE: ICD-10-CM

## 2021-01-13 PROCEDURE — 99024 POSTOP FOLLOW-UP VISIT: CPT

## 2021-01-13 RX ORDER — HYDROCODONE BITARTRATE AND ACETAMINOPHEN 5; 325 MG/1; MG/1
1 TABLET ORAL EVERY 6 HOURS PRN
Qty: 30 TABLET | Refills: 0 | Status: ON HOLD | OUTPATIENT
Start: 2021-01-13 | End: 2021-02-08

## 2021-01-13 RX ORDER — GABAPENTIN 100 MG/1
100 CAPSULE ORAL 3 TIMES DAILY
Qty: 90 CAPSULE | Refills: 0 | Status: SHIPPED | OUTPATIENT
Start: 2021-01-13

## 2021-01-13 NOTE — TELEPHONE ENCOUNTER
I called the patient back to let her know that Dr. Maloney will refill her pain medication for another 10 days he would also like for her to start either lyrica or neurotin. These will be prescribed today and sent to the Health system pharmacy in [place].    She also asked me about having the IVC filter removed and an appointment for that. That needs to be done by IR. She said she spoke with her PCP and they are not going to remove it. She asked that someone call her to help schedule at her home number [phone].    Felicia Portillo, ATC

## 2021-01-13 NOTE — TELEPHONE ENCOUNTER
ordered new scripts for Hydrocodone & Gabapentin.  Postop appt today.    FELICIA./Meka Vivas RN.

## 2021-01-13 NOTE — LETTER
[facility] OR[facility] [address]  4TH FLOOR  [facility] [address] [phone]        January 13, 2021    Regarding:  Moni Molina  [address] [place] [address] [address]  [place] [address]              To Whom It May Concern;    Moni Molina is in my medical care and has underwent surgery. She will be nonweightbearing and unable to drive until she returns to clinic for revaluation on Tuesday February 16th. Please excuse her from work at this time.    If you have any questions please give us a call at [phone]      Electronically signed, Damion Maloney MD.

## 2021-01-20 ENCOUNTER — TELEPHONE (OUTPATIENT)
Dept: ORTHOPEDICS | Facility: CLINIC | Age: 46
End: 2021-01-20

## 2021-01-20 DIAGNOSIS — S82.402A TIBIA/FIBULA FRACTURE, LEFT, CLOSED, INITIAL ENCOUNTER: ICD-10-CM

## 2021-01-20 DIAGNOSIS — Z87.81 S/P ORIF (OPEN REDUCTION INTERNAL FIXATION) FRACTURE: Primary | ICD-10-CM

## 2021-01-20 DIAGNOSIS — S82.202A TIBIA/FIBULA FRACTURE, LEFT, CLOSED, INITIAL ENCOUNTER: ICD-10-CM

## 2021-01-20 DIAGNOSIS — Z98.890 S/P ORIF (OPEN REDUCTION INTERNAL FIXATION) FRACTURE: Primary | ICD-10-CM

## 2021-01-20 NOTE — TELEPHONE ENCOUNTER
I called the patient with IR's phone number ([phone]) so that she can call and schedule an appointment to get the drain removed. I did also send another message to the coordinators to help with an appointment for the patient.     Felicia

## 2021-01-22 DIAGNOSIS — Z11.59 ENCOUNTER FOR SCREENING FOR OTHER VIRAL DISEASES: Primary | ICD-10-CM

## 2021-02-01 ENCOUNTER — TRANSFERRED RECORDS (OUTPATIENT)
Dept: HEALTH INFORMATION MANAGEMENT | Facility: CLINIC | Age: 46
End: 2021-02-01

## 2021-02-03 ENCOUNTER — TELEPHONE (OUTPATIENT)
Dept: INTERVENTIONAL RADIOLOGY/VASCULAR | Facility: CLINIC | Age: 46
End: 2021-02-03

## 2021-02-05 DIAGNOSIS — Z11.59 ENCOUNTER FOR SCREENING FOR OTHER VIRAL DISEASES: ICD-10-CM

## 2021-02-05 LAB
LABORATORY COMMENT REPORT: NORMAL
SARS-COV-2 RNA RESP QL NAA+PROBE: NEGATIVE
SARS-COV-2 RNA RESP QL NAA+PROBE: NORMAL
SPECIMEN SOURCE: NORMAL
SPECIMEN SOURCE: NORMAL

## 2021-02-05 PROCEDURE — U0005 INFEC AGEN DETEC AMPLI PROBE: HCPCS | Performed by: ORTHOPAEDIC SURGERY

## 2021-02-05 PROCEDURE — U0003 INFECTIOUS AGENT DETECTION BY NUCLEIC ACID (DNA OR RNA); SEVERE ACUTE RESPIRATORY SYNDROME CORONAVIRUS 2 (SARS-COV-2) (CORONAVIRUS DISEASE [COVID-19]), AMPLIFIED PROBE TECHNIQUE, MAKING USE OF HIGH THROUGHPUT TECHNOLOGIES AS DESCRIBED BY CMS-2020-01-R: HCPCS | Performed by: ORTHOPAEDIC SURGERY

## 2021-02-08 ENCOUNTER — APPOINTMENT (OUTPATIENT)
Dept: INTERVENTIONAL RADIOLOGY/VASCULAR | Facility: CLINIC | Age: 46
End: 2021-02-08
Attending: ORTHOPAEDIC SURGERY
Payer: COMMERCIAL

## 2021-02-08 ENCOUNTER — APPOINTMENT (OUTPATIENT)
Dept: MEDSURG UNIT | Facility: CLINIC | Age: 46
End: 2021-02-08
Attending: ORTHOPAEDIC SURGERY
Payer: COMMERCIAL

## 2021-02-08 ENCOUNTER — HOSPITAL ENCOUNTER (OUTPATIENT)
Facility: CLINIC | Age: 46
Discharge: HOME | End: 2021-02-08
Attending: ORTHOPAEDIC SURGERY | Admitting: RADIOLOGY
Payer: COMMERCIAL

## 2021-02-08 VITALS
RESPIRATION RATE: 18 BRPM | DIASTOLIC BLOOD PRESSURE: 81 MMHG | BODY MASS INDEX: 42.85 KG/M2 | TEMPERATURE: 98.5 F | OXYGEN SATURATION: 99 % | WEIGHT: 273 LBS | HEART RATE: 98 BPM | SYSTOLIC BLOOD PRESSURE: 129 MMHG | HEIGHT: 67 IN

## 2021-02-08 DIAGNOSIS — Z87.81 S/P ORIF (OPEN REDUCTION INTERNAL FIXATION) FRACTURE: ICD-10-CM

## 2021-02-08 DIAGNOSIS — S82.202A TIBIA/FIBULA FRACTURE, LEFT, CLOSED, INITIAL ENCOUNTER: ICD-10-CM

## 2021-02-08 DIAGNOSIS — Z98.890 S/P ORIF (OPEN REDUCTION INTERNAL FIXATION) FRACTURE: ICD-10-CM

## 2021-02-08 DIAGNOSIS — S82.402A TIBIA/FIBULA FRACTURE, LEFT, CLOSED, INITIAL ENCOUNTER: ICD-10-CM

## 2021-02-08 LAB
APTT PPP: 30 SEC (ref 22–37)
B-HCG SERPL-ACNC: <1 IU/L (ref 0–5)
CREAT SERPL-MCNC: 0.89 MG/DL (ref 0.52–1.04)
ERYTHROCYTE [DISTWIDTH] IN BLOOD BY AUTOMATED COUNT: 13.5 % (ref 10–15)
GFR SERPL CREATININE-BSD FRML MDRD: 78 ML/MIN/{1.73_M2}
HCT VFR BLD AUTO: 40.1 % (ref 35–47)
HGB BLD-MCNC: 13 G/DL (ref 11.7–15.7)
INR PPP: 1.14 (ref 0.86–1.14)
MCH RBC QN AUTO: 27.4 PG (ref 26.5–33)
MCHC RBC AUTO-ENTMCNC: 32.4 G/DL (ref 31.5–36.5)
MCV RBC AUTO: 84 FL (ref 78–100)
PLATELET # BLD AUTO: 265 10E9/L (ref 150–450)
RBC # BLD AUTO: 4.75 10E12/L (ref 3.8–5.2)
WBC # BLD AUTO: 4.8 10E9/L (ref 4–11)

## 2021-02-08 PROCEDURE — C1769 GUIDE WIRE: HCPCS

## 2021-02-08 PROCEDURE — 36415 COLL VENOUS BLD VENIPUNCTURE: CPT | Performed by: RADIOLOGY

## 2021-02-08 PROCEDURE — 84702 CHORIONIC GONADOTROPIN TEST: CPT | Performed by: RADIOLOGY

## 2021-02-08 PROCEDURE — 37193 REM ENDOVAS VENA CAVA FILTER: CPT | Mod: GC | Performed by: RADIOLOGY

## 2021-02-08 PROCEDURE — 255N000002 HC RX 255 OP 636: Performed by: ORTHOPAEDIC SURGERY

## 2021-02-08 PROCEDURE — 85610 PROTHROMBIN TIME: CPT | Performed by: RADIOLOGY

## 2021-02-08 PROCEDURE — 250N000011 HC RX IP 250 OP 636: Performed by: STUDENT IN AN ORGANIZED HEALTH CARE EDUCATION/TRAINING PROGRAM

## 2021-02-08 PROCEDURE — 999N000128 HC STATISTIC PERIPHERAL IV START W/O US GUIDANCE

## 2021-02-08 PROCEDURE — 250N000013 HC RX MED GY IP 250 OP 250 PS 637: Performed by: STUDENT IN AN ORGANIZED HEALTH CARE EDUCATION/TRAINING PROGRAM

## 2021-02-08 PROCEDURE — 82565 ASSAY OF CREATININE: CPT | Performed by: RADIOLOGY

## 2021-02-08 PROCEDURE — 37193 REM ENDOVAS VENA CAVA FILTER: CPT

## 2021-02-08 PROCEDURE — C1773 RET DEV, INSERTABLE: HCPCS

## 2021-02-08 PROCEDURE — 999N000133 HC STATISTIC PP CARE STAGE 2

## 2021-02-08 PROCEDURE — 85027 COMPLETE CBC AUTOMATED: CPT | Performed by: RADIOLOGY

## 2021-02-08 PROCEDURE — 99152 MOD SED SAME PHYS/QHP 5/>YRS: CPT | Mod: GC | Performed by: RADIOLOGY

## 2021-02-08 PROCEDURE — 272N000504 HC NEEDLE CR4

## 2021-02-08 PROCEDURE — 85730 THROMBOPLASTIN TIME PARTIAL: CPT | Performed by: RADIOLOGY

## 2021-02-08 PROCEDURE — 99152 MOD SED SAME PHYS/QHP 5/>YRS: CPT

## 2021-02-08 PROCEDURE — 99153 MOD SED SAME PHYS/QHP EA: CPT

## 2021-02-08 PROCEDURE — 250N000009 HC RX 250: Performed by: STUDENT IN AN ORGANIZED HEALTH CARE EDUCATION/TRAINING PROGRAM

## 2021-02-08 PROCEDURE — 250N000011 HC RX IP 250 OP 636: Performed by: RADIOLOGY

## 2021-02-08 RX ORDER — NALOXONE HYDROCHLORIDE 0.4 MG/ML
0.4 INJECTION, SOLUTION INTRAMUSCULAR; INTRAVENOUS; SUBCUTANEOUS
Status: DISCONTINUED | OUTPATIENT
Start: 2021-02-08 | End: 2021-02-08 | Stop reason: HOSPADM

## 2021-02-08 RX ORDER — FLUMAZENIL 0.1 MG/ML
0.2 INJECTION, SOLUTION INTRAVENOUS
Status: DISCONTINUED | OUTPATIENT
Start: 2021-02-08 | End: 2021-02-08 | Stop reason: HOSPADM

## 2021-02-08 RX ORDER — DEXTROSE MONOHYDRATE 25 G/50ML
25-50 INJECTION, SOLUTION INTRAVENOUS
Status: DISCONTINUED | OUTPATIENT
Start: 2021-02-08 | End: 2021-02-08 | Stop reason: HOSPADM

## 2021-02-08 RX ORDER — SODIUM CHLORIDE 9 MG/ML
INJECTION, SOLUTION INTRAVENOUS CONTINUOUS
Status: DISCONTINUED | OUTPATIENT
Start: 2021-02-08 | End: 2021-02-08 | Stop reason: HOSPADM

## 2021-02-08 RX ORDER — NALOXONE HYDROCHLORIDE 0.4 MG/ML
0.2 INJECTION, SOLUTION INTRAMUSCULAR; INTRAVENOUS; SUBCUTANEOUS
Status: DISCONTINUED | OUTPATIENT
Start: 2021-02-08 | End: 2021-02-08 | Stop reason: HOSPADM

## 2021-02-08 RX ORDER — FENTANYL CITRATE 50 UG/ML
25-50 INJECTION, SOLUTION INTRAMUSCULAR; INTRAVENOUS EVERY 5 MIN PRN
Status: DISCONTINUED | OUTPATIENT
Start: 2021-02-08 | End: 2021-02-08 | Stop reason: HOSPADM

## 2021-02-08 RX ORDER — ACETAMINOPHEN 500 MG
1000 TABLET ORAL ONCE
Status: COMPLETED | OUTPATIENT
Start: 2021-02-08 | End: 2021-02-08

## 2021-02-08 RX ORDER — HEPARIN SODIUM 200 [USP'U]/100ML
1 INJECTION, SOLUTION INTRAVENOUS CONTINUOUS PRN
Status: DISCONTINUED | OUTPATIENT
Start: 2021-02-08 | End: 2021-02-08 | Stop reason: HOSPADM

## 2021-02-08 RX ORDER — ACETAMINOPHEN 650 MG/1
650 SUPPOSITORY RECTAL ONCE
Status: COMPLETED | OUTPATIENT
Start: 2021-02-08 | End: 2021-02-08

## 2021-02-08 RX ORDER — ACETAMINOPHEN 325 MG/1
650 TABLET ORAL ONCE
Status: DISCONTINUED | OUTPATIENT
Start: 2021-02-08 | End: 2021-02-08

## 2021-02-08 RX ORDER — NICOTINE POLACRILEX 4 MG
15-30 LOZENGE BUCCAL
Status: DISCONTINUED | OUTPATIENT
Start: 2021-02-08 | End: 2021-02-08 | Stop reason: HOSPADM

## 2021-02-08 RX ORDER — LIDOCAINE 40 MG/G
CREAM TOPICAL
Status: DISCONTINUED | OUTPATIENT
Start: 2021-02-08 | End: 2021-02-08 | Stop reason: HOSPADM

## 2021-02-08 RX ORDER — ACETAMINOPHEN 500 MG
500 TABLET ORAL EVERY 6 HOURS PRN
Status: DISCONTINUED | OUTPATIENT
Start: 2021-02-08 | End: 2021-02-08 | Stop reason: HOSPADM

## 2021-02-08 RX ORDER — IODIXANOL 320 MG/ML
50 INJECTION, SOLUTION INTRAVASCULAR ONCE
Status: COMPLETED | OUTPATIENT
Start: 2021-02-08 | End: 2021-02-08

## 2021-02-08 RX ADMIN — ACETAMINOPHEN 1000 MG: 500 TABLET, FILM COATED ORAL at 14:13

## 2021-02-08 RX ADMIN — IODIXANOL 15 ML: 320 INJECTION, SOLUTION INTRAVASCULAR at 15:12

## 2021-02-08 RX ADMIN — FENTANYL CITRATE 25 MCG: 50 INJECTION, SOLUTION INTRAMUSCULAR; INTRAVENOUS at 14:51

## 2021-02-08 RX ADMIN — LIDOCAINE HYDROCHLORIDE 10 ML: 10 INJECTION, SOLUTION EPIDURAL; INFILTRATION; INTRACAUDAL; PERINEURAL at 15:10

## 2021-02-08 RX ADMIN — HEPARIN SODIUM 1 BAG: 200 INJECTION, SOLUTION INTRAVENOUS at 14:45

## 2021-02-08 RX ADMIN — MIDAZOLAM 0.5 MG: 1 INJECTION INTRAMUSCULAR; INTRAVENOUS at 14:51

## 2021-02-08 ASSESSMENT — MIFFLIN-ST. JEOR
SCORE: 1915.95
SCORE: 1915.95

## 2021-02-08 NOTE — DISCHARGE INSTRUCTIONS
[facility]  Going Home after IVC Filter Removal                                                     After you go home:    Drink plenty of fluids.    Resume your normal diet    Do not scrub the procedure site vigorously for 3 days    No lotion or powder to the puncture site for 3 days    DO NOT do any strenuous exercise or lifting greater than 10 pounds for at least 3 days following your procedure    HOB elevated to at least 30 degrees. Do not lay flat for at least 2 hours after you go home.  IF YOU WERE GIVEN SEDATION    No driving or alcoholic beverages today    Do not make any important or legal decisions today    We recommend an adult stay with you for the first 24 hours    CALL THE PHYSICIAN IF:    Monitor neck site for bleeding, swelling. If any bleeding or swelling immediately apply pressure and call the doctor.    If you notice any changes in your voice or breathing you should call your doctor immediately & come to the closest Emergency Department.  Do Not Drive Yourself.    You develop nausea or vomiting    You develop hives or a rash or any unexplained itching    ADDITIONAL INSTRUCTIONS:    [facility] INTERVENTIONAL RADIOLOGY DEPARTMENT          Procedure Physician:  Dr. Andrea           Date of procedure:February 8, 2021 Monday          Telephone numbers:     [phone]  Monday-Friday 8:00 am to 4:30 pm                                                [phone]  After 4:30 pm Monday-Friday, Weekends & Holidays. Ask for the Interventional Radiologist on call.Someone is available  24 hours/day          [facility] toll free number: [phone]  Monday-Friday 8:00 am to 4:30 pm

## 2021-02-08 NOTE — IP AVS SNAPSHOT
MRN:[id number]                      After Visit Summary   2/8/2021    Moni Molina    MRN: [id number]           Visit Information        Department      2/8/2021 10:49 AM [facility] Interventional Radiology          Review of your medicines      UNREVIEWED medicines. Ask your doctor about these medicines       Dose / Directions   acetaminophen 500 MG tablet  Commonly known as: TYLENOL  Used for: Tibia/fibula fracture, left, closed, initial encounter, Closed displaced comminuted fracture of shaft of left tibia, initial encounter      Dose: 1,000 mg  Take 2 tablets (1,000 mg) by mouth 3 times daily  Quantity: 84 tablet  Refills: 0     calcium carbonate 600 MG tablet  Commonly known as: OS-BALJINDER      Dose: 600 mg  Take 600 mg by mouth daily  Refills: 0     cetirizine 10 MG tablet  Commonly known as: zyrTEC      Dose: 10 mg  Take 10 mg by mouth daily  Refills: 0     gabapentin 100 MG capsule  Commonly known as: NEURONTIN  Used for: S/P ORIF (open reduction internal fixation) fracture      Dose: 100 mg  Take 1 capsule (100 mg) by mouth 3 times daily  Quantity: 90 capsule  Refills: 0     levonorgestrel 20 MCG/24HR IUD  Commonly known as: MIRENA      Dose: 1 each  1 each by Intrauterine route daily  Refills: 0     multivitamin, therapeutic with minerals Tabs tablet      Dose: 1 tablet  Take 1 tablet by mouth daily  Refills: 0     polyethylene glycol 17 GM/Dose powder  Commonly known as: MIRALAX  Used for: Tibia/fibula fracture, left, closed, initial encounter, Closed displaced comminuted fracture of shaft of left tibia, initial encounter      Dose: 17 g  Take 17 g by mouth daily  Quantity: 238 g  Refills: 0     rivaroxaban ANTICOAGULANT 20 MG Tabs tablet  Commonly known as: XARELTO  Indication: DVT-PE Treatment  Used for: Pulmonary embolism, bilateral (H), Acute deep vein thrombosis (DVT) of proximal vein of left lower extremity (H)  Ask about: Which instructions should I use?      Dose: 20  mg  Take 1 tablet (20 mg) by mouth daily (with dinner)  Quantity: 63 tablet  Refills: 0     senna-docusate 8.6-50 MG tablet  Commonly known as: SENOKOT-S/PERICOLACE  Used for: Tibia/fibula fracture, left, closed, initial encounter, Closed displaced comminuted fracture of shaft of left tibia, initial encounter      Dose: 2 tablet  Take 2 tablets by mouth 2 times daily  Quantity: 56 tablet  Refills: 0              Protect others around you: Learn how to safely use, store and throw away your medicines at www.disposemymeds.org.       Follow-ups after your visit       Your next 10 appointments already scheduled    Feb 16, 2021 10:50 AM  XR TIBIA & FIBULA LEFT 2 VIEWS with [id number]  [facility] Orthopedic Xray [facility] ) [address] [place] [address] [phone]   How do I prepare for my exam? (Food and drink instructions)  No Food and Drink Restrictions.    How do I prepare for my exam? (Other instructions)  You do not need to do anything special for this exam.    What should I wear: Wear comfortable clothes.    How long does the exam take: Most scans take less than 5 minutes.    What should I bring: Bring a list of your medicines, including vitamins, minerals and over-the-counter drugs. It is safest to leave personal items at home.    Do I need a :  No  is needed.    What do I need to tell my doctor: Tell your doctor if there s any chance you are pregnant.    What should I do after the exam: No restrictions, You may resume normal activities.    What is this test: An image of a specific body part shown in shades of black and white.    Who should I call with questions: If you have any questions, please call the Imaging Department where you will have your exam. Directions, parking instructions, and other information is available on our website, [email or web address].     Feb 16, 2021 11:10 AM  (Arrive by 10:55 AM)  RETURN  FOOT/ANKLE with Damion Maloney MD  [facility] ([facility] ) [address] [facility]  4th [facility] [address] [phone]   Please arrive 15 minutes prior to your scheduled appointment time to fill out any necessary paperwork.  If you have MyChart, you can complete questionnaires and register insurance information via e-checkin.  Copays cannot be collected via [facility] [employer] at this time.  After completing e-checkin, you will still need to stop at the  prior to the appointment but the process will be faster.  Other important items needed for your visit are:    -Insurance card, ID and copay  -Any paperwork (FMLA, WC, sports physical forms, etc.)  -List of all medications you are taking (include prescriptions, over-the-counter, and herbal)        Care Instructions       Further instructions from your care team       [facility]  Going Home after IVC Filter Removal                                                     After you go home:    Drink plenty of fluids.    Resume your normal diet    Do not scrub the procedure site vigorously for 3 days    No lotion or powder to the puncture site for 3 days    DO NOT do any strenuous exercise or lifting greater than 10 pounds for at least 3 days following your procedure    HOB elevated to at least 30 degrees. Do not lay flat for at least 2 hours after you go home.  IF YOU WERE GIVEN SEDATION    No driving or alcoholic beverages today    Do not make any important or legal decisions today    We recommend an adult stay with you for the first 24 hours    CALL THE PHYSICIAN IF:    Monitor neck site for bleeding, swelling. If any bleeding or swelling immediately apply pressure and call the doctor.    If you notice any changes in your voice or breathing you should call your doctor immediately & come to the closest Emergency Department.  Do Not Drive Yourself.    You develop  "nausea or vomiting    You develop hives or a rash or any unexplained itching    ADDITIONAL INSTRUCTIONS:    [facility] INTERVENTIONAL RADIOLOGY DEPARTMENT          Procedure Physician:  Dr. Andrea           Date of procedure:          Telephone numbers:     [phone]   8:00 am to 4:30 pm                                                [phone]  After 4:30 pm Monday-Friday, Weekends & Holidays. Ask for the Interventional Radiologist on call.Someone is available  24 hours/day          [facility] toll free number: [phone]  Monday-Friday 8:00 am to 4:30 pm          Additional Information About Your Visit       [employer] Information    [employer] lets you send messages to your doctor, view your test results, renew your prescriptions, schedule appointments and more. To sign up, go to [email or web address] . Click on \"Log in\" on the left side of the screen, which will take you to the Welcome page. Then click on \"Sign up Now\" on the right side of the page.     You will be asked to enter the access code listed below, as well as some personal information. Please follow the directions to create your username and password.     Your access code is: [email or web address]  Expires: 3/5/2021 10:50 AM     Your access code will  in 60 days. If you need help or a new code, please call your   [facility] clinic or [phone].       Care EveryWhere ID    This is your [facility] EveryWhere ID. This could be used by other organizations to access your [place] medical records  [id number]       Your Vitals Were  Most recent update: 2021  4:25 PM    Blood Pressure   123/80 (BP Location: Right arm)          Pulse   79          Temperature   98.5  F (36.9  C) (Oral)          Respirations   16          Height   1.702 m (5' 7\")             Weight   123.8 kg (273 lb)    Pulse Oximetry   100%    BMI (Body Mass Index)   42.76 kg/m           Primary Care Provider Fax #    Physician No Ref-Primary " [phone]      Equal Access to Services    [facility]: Hadii bertha ku anel Sorobel, wakenrickda luqadaha, qaybta kaalmada charlesandrew, waxpaul lenoin hayaasuhas sotolance thilaurennoemy todd. So [facility] [phone].    ATENCIÓN: Si habla español, tiene a turner disposición servicios gratuitos de asistencia lingüística. Llame al [phone].    We comply with applicable federal and state civil rights laws, including the [place] Human Rights Act. We do not discriminate on the basis of race, color, creed, Yazidi, national origin, marital status, age, disability, sex, sexual orientation, or gender identity.    If you would like an itemization of your charges they will now be available in [employer] 30 days after discharge. To access the itemized statements in [employer] go to billing/billing summary. From there select view account. There will be multiple tabs showing an overview of your account, detail, payments, and communications. From the communications tab you can see your monthly statements, your itemized statements, and any billing letters generated for your account. If you do not have a [employer] account and need help getting access please contact [employer] support at [phone].  If you would prefer to have your itemized statements mailed please contact our automated itemized bill request line at [phone] option  2.       Thank you!    Thank you for choosing [facility] for your care. Our goal is always to provide you with excellent care. Hearing back from our patients is one way we can continue to improve our services. Please take a few minutes to complete the written survey that you may receive in the mail after you visit with us. Thank you!            Medication List      ASK your doctor about these medications          Morning Afternoon Evening Bedtime As Needed    acetaminophen 500 MG tablet  Also known as: TYLENOL  INSTRUCTIONS: Take 2 tablets (1,000 mg) by mouth 3 times daily  LAST TAKEN: 1,000 mg on February 8, 2021  2:13  PM                     calcium carbonate 600 MG tablet  Also known as: OS-BALJINDER  INSTRUCTIONS: Take 600 mg by mouth daily                     cetirizine 10 MG tablet  Also known as: zyrTEC  INSTRUCTIONS: Take 10 mg by mouth daily                     gabapentin 100 MG capsule  Also known as: NEURONTIN  INSTRUCTIONS: Take 1 capsule (100 mg) by mouth 3 times daily                     levonorgestrel 20 MCG/24HR IUD  Also known as: MIRENA  INSTRUCTIONS: 1 each by Intrauterine route daily                     multivitamin, therapeutic with minerals Tabs tablet  INSTRUCTIONS: Take 1 tablet by mouth daily                     polyethylene glycol 17 GM/Dose powder  Also known as: MIRALAX  INSTRUCTIONS: Take 17 g by mouth daily                     rivaroxaban ANTICOAGULANT 20 MG Tabs tablet  Also known as: XARELTO  INSTRUCTIONS: Take 1 tablet (20 mg) by mouth daily (with dinner)  Reason for med: DVT-PE Treatment  Ask about: Which instructions should I use?                     senna-docusate 8.6-50 MG tablet  Also known as: SENOKOT-S/PERICOLACE  INSTRUCTIONS: Take 2 tablets by mouth 2 times daily

## 2021-02-08 NOTE — PROGRESS NOTES
1530  Returned to 2A from IR per cart accompanied by RN.  S/P IVC filter removal.  Site at right Neck is FDI.  Denies any pain.  1535  Nutrition of turkey sand,ice cream, & soda taken well.  No nausea.  1545   at bedside.  CTRN   1600  Resting with HOB elevate 60 degrees.  Denies any needs.  CTRN   1630  Pt is requesting discharge.  Called IR physician;  Spoke to Dr. Tucker.  OK to discharge at 1700.  1700  Discharge Instructions reviewed with pt.  Verbally demonstrates understanding of instructions. Pt understands that she is to keep HOB elevated at least 45 degrees   After she get home. Helped pt get dressed & place walking boot back on.  1710  Discharged to care of  per WC accompanied by staff RN.  CTRN

## 2021-02-08 NOTE — PRE-PROCEDURE
GENERAL PRE-PROCEDURE:   Procedure:  Image guided IVC filter removal  Date/Time:  2/8/2021 12:05 PM    Verbal consent obtained?: Yes    Written consent obtained?: Yes    Risks and benefits: Risks, benefits and alternatives were discussed    DC Plan: Appropriate discharge home plan in place for patients who are going home after procedure   Consent given by:  Patient  Patient states understanding of procedure being performed: Yes    Patient's understanding of procedure matches consent: Yes    Procedure consent matches procedure scheduled: Yes    Expected level of sedation:  Moderate  Appropriately NPO:  Yes  ASA Class:  Class 1- healthy patient  Mallampati  :  Grade 2- soft palate, base of uvula, tonsillar pillars, and portion of posterior pharyngeal wall visible  Lungs:  Lungs clear with good breath sounds bilaterally  Heart:  Normal heart sounds and rate  History & Physical reviewed:  History and physical reviewed and no updates needed  Statement of review:  I have reviewed the lab findings, diagnostic data, medications, and the plan for sedation

## 2021-02-08 NOTE — PROGRESS NOTES
1225  Prep is complete except for labs & IV.  V[facility] Access has been called.  Moni is here for IVC filter Removal.  Denies any pain.  She has a walking boot in place on Left Leg.  No weight bearing on this leg.  She does use a walker at home.  , Eric, is here as  home.  CTRN

## 2021-02-08 NOTE — PROCEDURES
[facility]    Procedure: IVC filter removal    Date/Time: 2/8/2021 3:17 PM  Performed by: Satish Andrea MD  Authorized by: Satish Andrea MD   IR Fellow Physician: Satish Andrea    UNIVERSAL PROTOCOL   Site Marked: NA  Prior Images Obtained and Reviewed:  Yes  Required items: Required blood products, implants, devices and special equipment available    Patient identity confirmed:  Verbally with patient, arm band, provided demographic data and hospital-assigned identification number  Patient was reevaluated immediately before administering moderate or deep sedation or anesthesia  Confirmation Checklist:  Patient's identity using two indicators, relevant allergies, procedure was appropriate and matched the consent or emergent situation and correct equipment/implants were available  Time out: Immediately prior to the procedure a time out was called    Universal Protocol: the Joint Commission Universal Protocol was followed    Preparation: Patient was prepped and draped in usual sterile fashion    ESBL (mL):  2         ANESTHESIA    Anesthesia: Local infiltration  Local Anesthetic:  Lidocaine 1% without epinephrine  Anesthetic Total (mL):  10      SEDATION    Patient Sedated: Yes    Vital signs: Vital signs monitored during sedation    See dictated procedure note for full details.  Findings: Infrarenal ALN IVC filter removed. Filter intact.    Specimens: none    Complications: None    Condition: Stable    Plan: - Bed rest for 2 hrs with head end of 30 deg bed up.    PROCEDURE   Patient Tolerance:  Patient tolerated the procedure well with no immediate complications    Length of time physician/provider present for 1:1 monitoring during sedation: 30

## 2021-02-08 NOTE — IP AVS SNAPSHOT
[facility] Interventional Radiology  [address] [address]  Phone: [phone]                                    After Visit Summary   2/8/2021    Moni Molina    MRN: [id number]           After Visit Summary Signature Page    I have received my discharge instructions, and my questions have been answered. I have discussed any challenges I see with this plan with the nurse or doctor.    ..........................................................................................................................................  Patient/Patient Representative Signature      ..........................................................................................................................................  Patient Representative Print Name and Relationship to Patient    ..................................................               ................................................  Date                                   Time    ..........................................................................................................................................  Reviewed by Signature/Title    ...................................................              ..............................................  Date                                               Time          22EPIC Rev 08/18

## 2021-02-15 ENCOUNTER — TRANSFERRED RECORDS (OUTPATIENT)
Dept: HEALTH INFORMATION MANAGEMENT | Facility: CLINIC | Age: 46
End: 2021-02-15

## 2021-02-16 ENCOUNTER — ANCILLARY PROCEDURE (OUTPATIENT)
Dept: GENERAL RADIOLOGY | Facility: CLINIC | Age: 46
End: 2021-02-16
Attending: ORTHOPAEDIC SURGERY
Payer: COMMERCIAL

## 2021-02-16 ENCOUNTER — OFFICE VISIT (OUTPATIENT)
Dept: ORTHOPEDICS | Facility: CLINIC | Age: 46
End: 2021-02-16
Payer: COMMERCIAL

## 2021-02-16 VITALS — WEIGHT: 273 LBS | HEIGHT: 67 IN | BODY MASS INDEX: 42.85 KG/M2

## 2021-02-16 DIAGNOSIS — S82.402A TIBIA/FIBULA FRACTURE, LEFT, CLOSED, INITIAL ENCOUNTER: Primary | ICD-10-CM

## 2021-02-16 DIAGNOSIS — S82.402A TIBIA/FIBULA FRACTURE, LEFT, CLOSED, INITIAL ENCOUNTER: ICD-10-CM

## 2021-02-16 DIAGNOSIS — Z98.890 S/P ORIF (OPEN REDUCTION INTERNAL FIXATION) FRACTURE: ICD-10-CM

## 2021-02-16 DIAGNOSIS — S82.202A TIBIA/FIBULA FRACTURE, LEFT, CLOSED, INITIAL ENCOUNTER: Primary | ICD-10-CM

## 2021-02-16 DIAGNOSIS — Z87.81 S/P ORIF (OPEN REDUCTION INTERNAL FIXATION) FRACTURE: ICD-10-CM

## 2021-02-16 DIAGNOSIS — S82.202A TIBIA/FIBULA FRACTURE, LEFT, CLOSED, INITIAL ENCOUNTER: ICD-10-CM

## 2021-02-16 PROCEDURE — 99024 POSTOP FOLLOW-UP VISIT: CPT | Performed by: ORTHOPAEDIC SURGERY

## 2021-02-16 PROCEDURE — 73590 X-RAY EXAM OF LOWER LEG: CPT | Mod: LT | Performed by: RADIOLOGY

## 2021-02-16 ASSESSMENT — MIFFLIN-ST. JEOR: SCORE: 1915.95

## 2021-02-16 NOTE — LETTER
Return to Work  2021     Seen today: yes    Patient:  Moni Molina  :   1975  MRN:     [id number]  Physician: DAMION MALONEY ROBY Molina may return to work on Date: 21 with the following restrictions.      Patient limitations:  Sitting job only for two months until 21    The next clinic appointment is scheduled for 6 weeks.      Call if you have questions:  [facility] [phone]      Fax to: [place] [phone]          Electronically signed by Damion Maloney MD

## 2021-02-16 NOTE — NURSING NOTE
Reason For Visit:   Chief Complaint   Patient presents with     RECHECK     6 week POP Left tibia ORIF DOS: 12/30/20         Pain Assessment  Patient Currently in Pain: No

## 2021-02-16 NOTE — PROGRESS NOTES
CHIEF COMPLAINT:  Status post left tibia IM nailing performed on 12/30/2020.      HISTORY OF PRESENT ILLNESS:  Mrs. Molina presents today in the company of her  for further evaluation.  Reports to be compliant.  Reports to be doing well.  Reports to be working with physical therapy.      PHYSICAL EXAMINATION:  On today's visit, she presents with excellent range of motion of the ankle with just missing a few degrees of dorsiflexion.  Otherwise, presents with a normal exam.  Presents with well-healed surgical incisions.  There is also full range of motion of the knee joint.      There is minimal pain with palpation of the fracture site.  There are no signs of infection or inflammation.      ASSESSMENT:  Status post left tibia IM nailing.      PLAN:  I discussed with the patient and her  that she is making excellent progress.  She is going to proceed with weightbearing as tolerated with the use of the CAM walker.  The CAM walker cannot be utilized for short distance walking.  However, she will be much more comfortable with the use of the CAM walker.      A new prescription for physical therapy was given to the patient without any restrictions.      The patient was given a workability form to proceed with a sitting job only starting 02/22/2021 for 2 months.      The patient will be reevaluated in 6 weeks from now, and at that time, AP and lateral x-rays of the left tibia will be obtained, and based on those findings, further recommendations will be given to the patient.

## 2021-02-16 NOTE — LETTER
Date:February 20, 2021      Patient was self referred, no letter generated. Do not send.        [facility] Health Information

## 2021-02-16 NOTE — LETTER
2/16/2021         RE: Moni Molina  [address] [facility] [address]  [place] [address]        Dear Colleague,    Thank you for referring your patient, Moni Molina, to the [facility] [place]. Please see a copy of my visit note below.    CHIEF COMPLAINT:  Status post left tibia IM nailing performed on 12/30/2020.      HISTORY OF PRESENT ILLNESS:  Mrs. Molina presents today in the company of her  for further evaluation.  Reports to be compliant.  Reports to be doing well.  Reports to be working with physical therapy.      PHYSICAL EXAMINATION:  On today's visit, she presents with excellent range of motion of the ankle with just missing a few degrees of dorsiflexion.  Otherwise, presents with a normal exam.  Presents with well-healed surgical incisions.  There is also full range of motion of the knee joint.      There is minimal pain with palpation of the fracture site.  There are no signs of infection or inflammation.      ASSESSMENT:  Status post left tibia IM nailing.      PLAN:  I discussed with the patient and her  that she is making excellent progress.  She is going to proceed with weightbearing as tolerated with the use of the CAM walker.  The CAM walker cannot be utilized for short distance walking.  However, she will be much more comfortable with the use of the CAM walker.      A new prescription for physical therapy was given to the patient without any restrictions.      The patient was given a workability form to proceed with a sitting job only starting 02/22/2021 for 2 months.      The patient will be reevaluated in 6 weeks from now, and at that time, AP and lateral x-rays of the left tibia will be obtained, and based on those findings, further recommendations will be given to the patient.           Again, thank you for allowing me to participate in the care of your patient.        Sincerely,        Damion Maloney MD

## 2021-02-24 DIAGNOSIS — S82.202A TIBIA/FIBULA FRACTURE, LEFT, CLOSED, INITIAL ENCOUNTER: Primary | ICD-10-CM

## 2021-02-24 DIAGNOSIS — S82.402A TIBIA/FIBULA FRACTURE, LEFT, CLOSED, INITIAL ENCOUNTER: Primary | ICD-10-CM

## 2021-02-28 ENCOUNTER — HEALTH MAINTENANCE LETTER (OUTPATIENT)
Age: 46
End: 2021-02-28

## 2021-03-02 ENCOUNTER — TELEPHONE (OUTPATIENT)
Dept: ORTHOPEDICS | Facility: CLINIC | Age: 46
End: 2021-03-02

## 2021-03-02 NOTE — TELEPHONE ENCOUNTER
I called patient and let her know that Dr. Maloney has signed her form and she can come and pick it up. The forms will be in my mailbox on the 4th floor.    Felicia Portillo ATC

## 2021-03-30 ENCOUNTER — ANCILLARY PROCEDURE (OUTPATIENT)
Dept: GENERAL RADIOLOGY | Facility: CLINIC | Age: 46
End: 2021-03-30
Attending: ORTHOPAEDIC SURGERY
Payer: COMMERCIAL

## 2021-03-30 ENCOUNTER — OFFICE VISIT (OUTPATIENT)
Dept: ORTHOPEDICS | Facility: CLINIC | Age: 46
End: 2021-03-30
Payer: COMMERCIAL

## 2021-03-30 DIAGNOSIS — S82.402A TIBIA/FIBULA FRACTURE, LEFT, CLOSED, INITIAL ENCOUNTER: ICD-10-CM

## 2021-03-30 DIAGNOSIS — S82.202A TIBIA/FIBULA FRACTURE, LEFT, CLOSED, INITIAL ENCOUNTER: ICD-10-CM

## 2021-03-30 DIAGNOSIS — S82.202A TIBIA/FIBULA FRACTURE, LEFT, CLOSED, INITIAL ENCOUNTER: Primary | ICD-10-CM

## 2021-03-30 DIAGNOSIS — S82.402A TIBIA/FIBULA FRACTURE, LEFT, CLOSED, INITIAL ENCOUNTER: Primary | ICD-10-CM

## 2021-03-30 PROCEDURE — 73590 X-RAY EXAM OF LOWER LEG: CPT | Mod: LT | Performed by: RADIOLOGY

## 2021-03-30 PROCEDURE — 99024 POSTOP FOLLOW-UP VISIT: CPT | Performed by: ORTHOPAEDIC SURGERY

## 2021-03-30 PROCEDURE — 73630 X-RAY EXAM OF FOOT: CPT | Mod: LT | Performed by: RADIOLOGY

## 2021-03-30 NOTE — LETTER
3/30/2021         RE: Moni Molina  [address] [place] [address] [address]  [place] [address]        Dear Colleague,    Thank you for referring your patient, Moni Molina, to the [facility] [place]. Please see a copy of my visit note below.    CHIEF COMPLAINT:  Status post left tibial fracture, internal fixation performed 12/30/2020.      HISTORY OF PRESENT ILLNESS:  Mrs. Molina presents today for further followup in the accompaniment of her daughter.  Reports to be doing well.  Reports to be making progress.      PHYSICAL EXAMINATION:  On today's visit, she presents with full range of motion of the left ankle and knee.  There is no pain on palpation of the fracture site.  All surgical incisions are well-healed.      RADIOGRAPHIC EVALUATION:  Two views of the left tib-fib were obtained today which were significant for showing further consolidation of the fracture site.  There is callus formation.  Hardware is intact and in place.      Three views of the left foot were obtained today which were significant for showing no acute findings or any former fractures.      ASSESSMENT:  Status post left tibia open reduction, internal fixation.      PLAN:  I discussed with the patient that she is making excellent progress.  She is going to proceed with activity as tolerated.  The CAM walker will be utilized for comfort purposes.      She will continue working with physical therapy.  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      She will follow up in 2 months from now, and at that time, 2 views of the tib-fib will be obtained, and based on those findings, further recommendations will be given to the patient.      All questions were answered.      TT 20 minutes.  CT 15 minutes.             Again, thank you for allowing me to participate in the care of your patient.         Sincerely,        Damion Maloney MD

## 2021-03-30 NOTE — LETTER
[facility] OR[facility] [address]  [address] [facility] [address]  [phone]        March 30, 2021    Regarding:  Moni Molina  [address] [place] [address] [address]  [place] [address]              To Whom It May Concern;    Moni was seen and evaluated in clinic today. She may return to work at a sitting job only until 5/1/21.    If you have any questions please give us a call at [phone]      Electronically signed, Damion Maloney MD

## 2021-03-30 NOTE — LETTER
Date:April 4, 2021      Patient was self referred, no letter generated. Do not send.        [facility] Health Information

## 2021-03-30 NOTE — NURSING NOTE
Reason For Visit:   Chief Complaint   Patient presents with     RECHECK     3 month follow up ORIF left tibia DOS: 12/30/20       There were no vitals taken for this visit.    Pain Assessment  Patient Currently in Pain: Ciro Portillo, ATC

## 2021-03-30 NOTE — LETTER
[facility] ORTHOPED[facility] CL[facility] [address]  4TH FLOOR  [facility] [address]  [phone]        March 30, 2021    Regarding:  Moni Molina  [address] [place] [address] [address]  [place] [address]              To Whom It May Concern;    Moni was seen and evaluated in clinic today. She may return to work with the following restrictions:    Standing limited to 30 minutes at a time  No other restrictions    If you have any questions please give us a call at [phone].      Electronically signed, Damion Maloney MD

## 2021-03-30 NOTE — PROGRESS NOTES
CHIEF COMPLAINT:  Status post left tibial fracture, internal fixation performed 12/30/2020.      HISTORY OF PRESENT ILLNESS:  Mrs. Molina presents today for further followup in the accompaniment of her daughter.  Reports to be doing well.  Reports to be making progress.      PHYSICAL EXAMINATION:  On today's visit, she presents with full range of motion of the left ankle and knee.  There is no pain on palpation of the fracture site.  All surgical incisions are well-healed.      RADIOGRAPHIC EVALUATION:  Two views of the left tib-fib were obtained today which were significant for showing further consolidation of the fracture site.  There is callus formation.  Hardware is intact and in place.      Three views of the left foot were obtained today which were significant for showing no acute findings or any former fractures.      ASSESSMENT:  Status post left tibia open reduction, internal fixation.      PLAN:  I discussed with the patient that she is making excellent progress.  She is going to proceed with activity as tolerated.  The CAM walker will be utilized for comfort purposes.      She will continue working with physical therapy.  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      She will follow up in 2 months from now, and at that time, 2 views of the tib-fib will be obtained, and based on those findings, further recommendations will be given to the patient.      All questions were answered.      TT 20 minutes.  CT 15 minutes.

## 2021-04-02 DIAGNOSIS — Z87.81 S/P ORIF (OPEN REDUCTION INTERNAL FIXATION) FRACTURE: Primary | ICD-10-CM

## 2021-04-02 DIAGNOSIS — Z98.890 S/P ORIF (OPEN REDUCTION INTERNAL FIXATION) FRACTURE: Primary | ICD-10-CM

## 2021-06-01 ENCOUNTER — ANCILLARY PROCEDURE (OUTPATIENT)
Dept: GENERAL RADIOLOGY | Facility: CLINIC | Age: 46
End: 2021-06-01
Attending: ORTHOPAEDIC SURGERY
Payer: COMMERCIAL

## 2021-06-01 ENCOUNTER — OFFICE VISIT (OUTPATIENT)
Dept: ORTHOPEDICS | Facility: CLINIC | Age: 46
End: 2021-06-01
Payer: COMMERCIAL

## 2021-06-01 DIAGNOSIS — S82.202A TIBIA/FIBULA FRACTURE, LEFT, CLOSED, INITIAL ENCOUNTER: Primary | ICD-10-CM

## 2021-06-01 DIAGNOSIS — Z98.890 S/P ORIF (OPEN REDUCTION INTERNAL FIXATION) FRACTURE: ICD-10-CM

## 2021-06-01 DIAGNOSIS — S82.402A TIBIA/FIBULA FRACTURE, LEFT, CLOSED, INITIAL ENCOUNTER: Primary | ICD-10-CM

## 2021-06-01 DIAGNOSIS — Z87.81 S/P ORIF (OPEN REDUCTION INTERNAL FIXATION) FRACTURE: ICD-10-CM

## 2021-06-01 DIAGNOSIS — M25.572 PAIN IN JOINT, ANKLE AND FOOT, LEFT: ICD-10-CM

## 2021-06-01 PROCEDURE — 73590 X-RAY EXAM OF LOWER LEG: CPT | Mod: LT | Performed by: RADIOLOGY

## 2021-06-01 PROCEDURE — 99212 OFFICE O/P EST SF 10 MIN: CPT | Performed by: ORTHOPAEDIC SURGERY

## 2021-06-01 NOTE — LETTER
[facility] [address] [address]  [phone]        June 1, 2021    Regarding:  Moni Molina  [address] [place] [address] [address]  [place] [address]        To Whom It May Concern;      Moni Molina may return to work with no restrictions. If you have any questions please call us at [phone].      Electronically signed, Damion Maloney MD

## 2021-06-01 NOTE — NURSING NOTE
Reason For Visit:   Chief Complaint   Patient presents with     RECHECK     ORIF, IM nail left tib/fib fx DOS 12/30/20 - pt having increased swelling and pain as of ~ 4 days ago       There were no vitals taken for this visit.    Pain Assessment  Patient Currently in Pain: Yes  0-10 Pain Scale: 6  Primary Pain Location: Ankle(Left)    Saloni Vital, ATC

## 2021-06-01 NOTE — PROGRESS NOTES
CHIEF COMPLAINT:  Status post IM nailing of the left tibia performed on 12/30/2020.    HISTORY OF PRESENT ILLNESS:  Mrs. Molina presents today for further followup.  Reports to be doing well.  Reports to have a minor discomfort but mainly to have a fair amount of swelling across the left lower leg.  Continue working with Physical Therapy.    PHYSICAL EXAMINATION:  She presents with full range of motion of the left knee and ankle.  There is some diffuse swelling with a 1-2+ pitting edema diffusely through the lower leg.  All surgical incisions are well healed.    IMAGING:  AP and lateral x-rays of the tib-fib were reviewed today, which were significant for showing excellent consolidation of the fracture site.  Hardware is intact and in place.  Alignment is excellent.    ASSESSMENT:  Status post left tibia and fibula IM nailing.    PLAN:  Discussed with the patient that at this point, she has no restrictions.  She will follow up on a p.r.n. basis.  Encouraged her to proceed with the use of a compression stocking in order to control her swelling, which I believe is related to the DVT.    The patient was encouraged to visit with us in 2 to 3 months from now if she is not happy with the function of her left lower leg.    TT:  20 minutes.  CT:  15 minutes.

## 2021-06-01 NOTE — LETTER
Date:June 12, 2021      Patient was self referred, no letter generated. Do not send.        [facility] Health Information

## 2021-06-01 NOTE — LETTER
6/1/2021         RE: Moni Molina  [facility]  [place] [address]        Dear Colleague,    Thank you for referring your patient, Moni Molina, to the [facility] [place]. Please see a copy of my visit note below.    CHIEF COMPLAINT:  Status post IM nailing of the left tibia performed on 12/30/2020.    HISTORY OF PRESENT ILLNESS:  Mrs. Molina presents today for further followup.  Reports to be doing well.  Reports to have a minor discomfort but mainly to have a fair amount of swelling across the left lower leg.  Continue working with Physical Therapy.    PHYSICAL EXAMINATION:  She presents with full range of motion of the left knee and ankle.  There is some diffuse swelling with a 1-2+ pitting edema diffusely through the lower leg.  All surgical incisions are well healed.    IMAGING:  AP and lateral x-rays of the tib-fib were reviewed today, which were significant for showing excellent consolidation of the fracture site.  Hardware is intact and in place.  Alignment is excellent.    ASSESSMENT:  Status post left tibia and fibula IM nailing.    PLAN:  Discussed with the patient that at this point, she has no restrictions.  She will follow up on a p.r.n. basis.  Encouraged her to proceed with the use of a compression stocking in order to control her swelling, which I believe is related to the DVT.    The patient was encouraged to visit with us in 2 to 3 months from now if she is not happy with the function of her left lower leg.    TT:  20 minutes.  CT:  15 minutes.            Again, thank you for allowing me to participate in the care of your patient.        Sincerely,        Damion Maloney MD

## 2021-08-08 ENCOUNTER — HEALTH MAINTENANCE LETTER (OUTPATIENT)
Age: 46
End: 2021-08-08

## 2021-10-03 ENCOUNTER — HEALTH MAINTENANCE LETTER (OUTPATIENT)
Age: 46
End: 2021-10-03

## 2022-03-20 ENCOUNTER — HEALTH MAINTENANCE LETTER (OUTPATIENT)
Age: 47
End: 2022-03-20

## 2022-05-10 PROCEDURE — 96361 HYDRATE IV INFUSION ADD-ON: CPT

## 2022-05-10 PROCEDURE — 96375 TX/PRO/DX INJ NEW DRUG ADDON: CPT

## 2022-05-10 PROCEDURE — 96374 THER/PROPH/DIAG INJ IV PUSH: CPT

## 2022-05-10 PROCEDURE — 99285 EMERGENCY DEPT VISIT HI MDM: CPT | Mod: 25

## 2022-05-11 ENCOUNTER — HOSPITAL ENCOUNTER (EMERGENCY)
Facility: HOSPITAL | Age: 47
Discharge: HOME | End: 2022-05-11
Attending: EMERGENCY MEDICINE | Admitting: EMERGENCY MEDICINE
Payer: COMMERCIAL

## 2022-05-11 ENCOUNTER — APPOINTMENT (OUTPATIENT)
Dept: CT IMAGING | Facility: HOSPITAL | Age: 47
End: 2022-05-11
Attending: EMERGENCY MEDICINE
Payer: COMMERCIAL

## 2022-05-11 VITALS
HEART RATE: 76 BPM | SYSTOLIC BLOOD PRESSURE: 143 MMHG | RESPIRATION RATE: 16 BRPM | DIASTOLIC BLOOD PRESSURE: 86 MMHG | OXYGEN SATURATION: 10 % | TEMPERATURE: 98.4 F

## 2022-05-11 DIAGNOSIS — R51.9 NONINTRACTABLE HEADACHE, UNSPECIFIED CHRONICITY PATTERN, UNSPECIFIED HEADACHE TYPE: ICD-10-CM

## 2022-05-11 PROBLEM — I82.452 ACUTE DEEP VEIN THROMBOSIS (DVT) OF LEFT PERONEAL VEIN (H): Status: ACTIVE | Noted: 2022-05-11

## 2022-05-11 PROBLEM — D69.6 THROMBOCYTOPENIA (H): Status: ACTIVE | Noted: 2022-05-11

## 2022-05-11 PROCEDURE — 258N000003 HC RX IP 258 OP 636: Performed by: EMERGENCY MEDICINE

## 2022-05-11 PROCEDURE — 70450 CT HEAD/BRAIN W/O DYE: CPT

## 2022-05-11 PROCEDURE — 250N000011 HC RX IP 250 OP 636: Performed by: EMERGENCY MEDICINE

## 2022-05-11 RX ORDER — DIPHENHYDRAMINE HYDROCHLORIDE 50 MG/ML
25 INJECTION INTRAMUSCULAR; INTRAVENOUS ONCE
Status: COMPLETED | OUTPATIENT
Start: 2022-05-11 | End: 2022-05-11

## 2022-05-11 RX ORDER — METOCLOPRAMIDE HYDROCHLORIDE 5 MG/ML
10 INJECTION INTRAMUSCULAR; INTRAVENOUS ONCE
Status: COMPLETED | OUTPATIENT
Start: 2022-05-11 | End: 2022-05-11

## 2022-05-11 RX ORDER — KETOROLAC TROMETHAMINE 30 MG/ML
15 INJECTION, SOLUTION INTRAMUSCULAR; INTRAVENOUS ONCE
Status: COMPLETED | OUTPATIENT
Start: 2022-05-11 | End: 2022-05-11

## 2022-05-11 RX ADMIN — DIPHENHYDRAMINE HYDROCHLORIDE 25 MG: 50 INJECTION, SOLUTION INTRAMUSCULAR; INTRAVENOUS at 01:19

## 2022-05-11 RX ADMIN — KETOROLAC TROMETHAMINE 15 MG: 30 INJECTION, SOLUTION INTRAMUSCULAR at 01:13

## 2022-05-11 RX ADMIN — SODIUM CHLORIDE 1000 ML: 9 INJECTION, SOLUTION INTRAVENOUS at 01:10

## 2022-05-11 RX ADMIN — METOCLOPRAMIDE HYDROCHLORIDE 10 MG: 5 INJECTION INTRAMUSCULAR; INTRAVENOUS at 01:15

## 2022-05-11 ASSESSMENT — ENCOUNTER SYMPTOMS
ABDOMINAL PAIN: 1
SORE THROAT: 0
HEADACHES: 1
CONFUSION: 0
EYE PAIN: 0
FEVER: 0
NECK STIFFNESS: 0
COUGH: 0
SINUS PRESSURE: 0
BRUISES/BLEEDS EASILY: 0

## 2022-05-11 NOTE — ED TRIAGE NOTES
Pt arrives to triage ambulatory w/  tearful endorsing left sided headache and eye pain. No vision concerns. Pt has allergies and has been sneezing for the past three days and endorses mild cough. No hx of migraines. 10/10 pain    triage RN having difficulty obtaining BP-able to obtain on left forearm

## 2022-05-11 NOTE — ED TRIAGE NOTES
Pt having severe head pain requesting something for the pain. Pt endorses she has taken ibuprofen before for pain. Writer will provider ibuprofen in waiting room

## 2022-05-11 NOTE — ED PROVIDER NOTES
EMERGENCY DEPARTMENT ENCOUNTER      NAME: Moni Molina  AGE: 47 year old female  YOB: 1975  MRN: [id number]  EVALUATION DATE & TIME: No admission date for patient encounter.    PCP: No Ref-Primary, Physician        Chief Complaint   Patient presents with     Headache         FINAL IMPRESSION:  1. Nonintractable headache, unspecified chronicity pattern, unspecified headache type          ED COURSE & MEDICAL DECISION MAKING:    Pertinent Labs & Imaging studies reviewed. (See chart for details)  PPE: Goggles and N95 were worn.   47 year old female presents to the Emergency Department for evaluation of headache    A lumbar puncture was deemed unecessary because the patient's symptoms did not indicate a subarachnoid hemorrhage, mass lesion, intracerebralhemmorhage or meningitis; The patient had no fever, rash, meningismus, the headache did not come on suddenly, and the patient's neurologic examination was normal.    CT head negative.  Based on [place] subarachnoid rule LP not indicated.  Given migraine medicines and headache went away.  Likely migraine.  Temporal arteritis, sinusitis, dental abscess, glaucoma.     Normal neuro exam.  Symptom-free currently.       1:58 AM I met with the patient, obtained history, performed an initial exam, and discussed options and plan for diagnostics and treatment here in the ED.   3:00 AM I discussed the plan for discharge with the patient, and patient is agreeable.  We discussed supportive cares at home and reasons for return to the ER including new or worsening symptoms - all questions and concerns addressed.  Patient to be discharged by RN.             At the conclusion of the encounter I discussed the results of all of the tests and the disposition. The questions were answered. The patient or family acknowledged understanding and was agreeable with the care plan.       MEDICATIONS GIVEN IN THE EMERGENCY:  Medications   0.9% sodium chloride BOLUS (0 mLs Intravenous  Stopped 5/11/22 0303)   metoclopramide (REGLAN) injection 10 mg (10 mg Intravenous Given 5/11/22 0115)   diphenhydrAMINE (BENADRYL) injection 25 mg (25 mg Intravenous Given 5/11/22 0119)   ketorolac (TORADOL) injection 15 mg (15 mg Intravenous Given 5/11/22 0113)       NEW PRESCRIPTIONS STARTED AT TODAY'S ER VISIT  Discharge Medication List as of 5/11/2022  3:03 AM               =================================================================    HPI    Patient information was obtained from: patient     Use of Intrepreter: N/A       Moni CA Tracy is a 47 year old female with a pertinent history of DVT who presents to this ED by walk in for evaluation of headache.     Around 11:30 PM yesterday (~2.5 hours ago), patient developed a severe left-sided throbbing headache. She was given medication in triage which resolved the headache. Patient denies history of aneurysms. She stopped taking her anticoagulant a year ago. Endorses that she gets seasonal allergies around this time. Denies COVID-19 exposures. Patient denies dental pain, ear pain, neck stiffness, fever, cough, or any other complaints at this time.     REVIEW OF SYSTEMS   Review of Systems   Constitutional: Negative for fever.   HENT: Positive for drooling. Negative for dental problem, ear pain, sinus pressure and sore throat.    Eyes: Negative for pain and visual disturbance.   Respiratory: Negative for cough.    Cardiovascular: Negative for chest pain.   Gastrointestinal: Positive for abdominal pain.   Musculoskeletal: Negative for neck stiffness.   Skin: Negative for rash.   Allergic/Immunologic: Negative for immunocompromised state.   Neurological: Positive for headaches (left-sided).   Hematological: Does not bruise/bleed easily.   Psychiatric/Behavioral: Negative for confusion.         PAST MEDICAL HISTORY:  Past Medical History:   Diagnosis Date     Acute deep vein thrombosis (DVT) of left peroneal vein (H)        PAST SURGICAL HISTORY:  Past  Surgical History:   Procedure Laterality Date     IR IVC FILTER PLACEMENT  12/23/2020     IR IVC FILTER REMOVAL  2/8/2021     OPEN REDUCTION INTERNAL FIXATION TIBIA Left 12/30/2020    Procedure: OPEN REDUCTION INTERNAL FIXATION, FRACTURE, TIBIA;  Surgeon: Damion Maloney MD;  Location:  OR           CURRENT MEDICATIONS:    Discharge Medication List as of 5/11/2022  3:03 AM      CONTINUE these medications which have NOT CHANGED    Details   acetaminophen (TYLENOL) 500 MG tablet Take 2 tablets (1,000 mg) by mouth 3 times daily, Disp-84 tablet, R-0, E-Prescribe      calcium carbonate (OS-BALJINDER) 600 MG tablet Take 600 mg by mouth daily, Historical      cetirizine (ZYRTEC) 10 MG tablet Take 10 mg by mouth daily, Historical      gabapentin (NEURONTIN) 100 MG capsule Take 1 capsule (100 mg) by mouth 3 times daily, Disp-90 capsule, R-0, E-Prescribe      levonorgestrel (MIRENA) 20 MCG/24HR IUD 1 each by Intrauterine route dailyHistorical      multivitamin, therapeutic with minerals (THERA-VIT-M) TABS tablet Take 1 tablet by mouth daily, Historical      polyethylene glycol (MIRALAX) 17 GM/Dose powder Take 17 g by mouth daily, Disp-238 g, R-0, E-Prescribe      rivaroxaban ANTICOAGULANT (XARELTO) 20 MG TABS tablet Take 1 tablet (20 mg) by mouth daily (with dinner), Disp-63 tablet, R-0, E-Prescribe      senna-docusate (SENOKOT-S/PERICOLACE) 8.6-50 MG tablet Take 2 tablets by mouth 2 times daily, Disp-56 tablet, R-0, E-Prescribe             ALLERGIES:  Allergies   Allergen Reactions     Seasonal Allergies        FAMILY HISTORY:  History reviewed. No pertinent family history.    SOCIAL HISTORY:   Social History     Socioeconomic History     Marital status:    Tobacco Use     Smoking status: Never Smoker     Smokeless tobacco: Never Used   Substance and Sexual Activity     Alcohol use: Yes     Comment: occasionally     Drug use: Not Currently       VITALS:  Patient Vitals for the past 24 hrs:   BP Temp Temp src Pulse  Resp SpO2   05/11/22 0258 -- -- -- 76 -- --   05/11/22 0257 -- -- -- 100 -- --   05/11/22 0249 (!) 143/86 -- -- 76 16 (!) 10 %   05/11/22 0126 (!) 166/99 -- -- 72 -- 100 %   05/10/22 2344 (!) 147/99 -- -- -- -- --   05/10/22 2343 (!) 146/103 -- -- -- 18 --   05/10/22 2341 -- 98.4  F (36.9  C) Oral -- -- --   05/10/22 2339 -- -- -- 78 -- 100 %       PHYSICAL EXAM   Vitals: BP (!) 143/86   Pulse 76   Temp 98.4  F (36.9  C) (Oral)   Resp 16   SpO2 (!) 10%   General: Appears in no acute distress, awake, alert, interactive.  Eyes: Conjunctivae non-injected. Sclera anicteric.  No steamy cornea.  Pupils equal round reactive  HENT: Atraumatic. No TMJ tenderness. No TA tenderness  Neck: Supple.  Respiratory/Chest: Respiration unlabored.  Heart: RRR  Abdomen: non distended  Musculoskeletal: Normal extremities. No edema or erythema.  Skin: Normal color. No rash or diaphoresis.  Neurologic: Face symmetric, moves all extremities spontaneously. Speech clear. CN 2-12 grossly normal.   Psychiatric: Oriented to person, place, and time. Affect appropriate.    LAB:  All pertinent labs reviewed and interpreted.  Results for orders placed or performed during the hospital encounter of 05/11/22   Head CT w/o contrast    Impression    IMPRESSION:  1.  No acute intracranial process.       RADIOLOGY:  Reviewed all pertinent imaging. Please see official radiology report.  Head CT w/o contrast   Final Result   IMPRESSION:   1.  No acute intracranial process.          PROCEDURES:   none      I, Genny Boyer, am serving as a scribe to document services personally performed by Dr. Green based on my observation and the provider's statements to me. I, Sorin Green MD attest that Genny Boyer is acting in a scribe capacity, has observed my performance of the services and has documented them in accordance with my direction.    Sorin Green M.D.  Emergency Medicine  [facility] EMERGENCY DEPARTMENT  [id number]  [facility] [address]  [phone]  Dept: [phone]     Robert Green MD  05/11/22 0324

## 2022-09-04 ENCOUNTER — HEALTH MAINTENANCE LETTER (OUTPATIENT)
Age: 47
End: 2022-09-04

## 2023-04-30 ENCOUNTER — HEALTH MAINTENANCE LETTER (OUTPATIENT)
Age: 48
End: 2023-04-30

## 2023-10-01 ENCOUNTER — HEALTH MAINTENANCE LETTER (OUTPATIENT)
Age: 48
End: 2023-10-01

## 2024-07-07 ENCOUNTER — HEALTH MAINTENANCE LETTER (OUTPATIENT)
Age: 49
End: 2024-07-07

## 2024-11-24 ENCOUNTER — HEALTH MAINTENANCE LETTER (OUTPATIENT)
Age: 49
End: 2024-11-24

## (undated) DEVICE — SU VICRYL 2-0 CT-1 27" UND J259H

## (undated) DEVICE — DRAPE STOCKINETTE 6" 8564

## (undated) DEVICE — SOL NACL 0.9% IRRIG 1000ML BOTTLE 2F7124

## (undated) DEVICE — DRAPE SHEET REV FOLD 3/4 9349

## (undated) DEVICE — SPONGE LAP 18X18" X8435

## (undated) DEVICE — DRAPE C-ARMOR 5 SIDED 5523

## (undated) DEVICE — SUCTION MANIFOLD DORNOCH ULTRA CART UL-CL500

## (undated) DEVICE — PACK SET-UP STD 9102

## (undated) DEVICE — SUCTION TIP YANKAUER STR K87

## (undated) DEVICE — Device

## (undated) DEVICE — ESU GROUND PAD ADULT W/CORD E7507

## (undated) DEVICE — DRSG ABDOMINAL 07 1/2X8" 7197D

## (undated) DEVICE — GLOVE PROTEXIS BLUE W/NEU-THERA 8.0  2D73EB80

## (undated) DEVICE — DRAPE STERI U 1015

## (undated) DEVICE — LINEN TOWEL PACK X30 5481

## (undated) DEVICE — DRAPE C-ARM W/STRAPS 42X72" 07-CA104

## (undated) DEVICE — LINEN TOWEL PACK X6 WHITE 5487

## (undated) DEVICE — GLOVE PROTEXIS W/NEU-THERA 7.5  2D73TE75

## (undated) DEVICE — SU ETHILON 3-0 PS-1 18" 1663H

## (undated) DEVICE — DRSG STERI STRIP 1/2X4" R1547

## (undated) DEVICE — LIGHT HANDLE X1 31140133

## (undated) DEVICE — DRSG ADAPTIC 3X16"  6114

## (undated) DEVICE — CAST PADDING 6" STERILE 9046S

## (undated) DEVICE — BNDG ELASTIC 6"X5YDS UNSTERILE 6611-60

## (undated) DEVICE — DRAPE IOBAN INCISE 23X17" 6650EZ

## (undated) DEVICE — FREEHAND DRILL

## (undated) DEVICE — SU ETHILON 2-0 FS 18" 664H

## (undated) DEVICE — SOL WATER IRRIG 1000ML BOTTLE 2F7114

## (undated) DEVICE — DRSG GAUZE 4X4" TRAY 6939

## (undated) RX ORDER — ACETAMINOPHEN 500 MG
TABLET ORAL
Status: DISPENSED
Start: 2021-02-08

## (undated) RX ORDER — PROPOFOL 10 MG/ML
INJECTION, EMULSION INTRAVENOUS
Status: DISPENSED
Start: 2020-12-30

## (undated) RX ORDER — FENTANYL CITRATE 50 UG/ML
INJECTION, SOLUTION INTRAMUSCULAR; INTRAVENOUS
Status: DISPENSED
Start: 2020-12-23

## (undated) RX ORDER — ONDANSETRON 2 MG/ML
INJECTION INTRAMUSCULAR; INTRAVENOUS
Status: DISPENSED
Start: 2020-12-30

## (undated) RX ORDER — CEFAZOLIN SODIUM 1 G/3ML
INJECTION, POWDER, FOR SOLUTION INTRAMUSCULAR; INTRAVENOUS
Status: DISPENSED
Start: 2020-12-30

## (undated) RX ORDER — FENTANYL CITRATE 50 UG/ML
INJECTION, SOLUTION INTRAMUSCULAR; INTRAVENOUS
Status: DISPENSED
Start: 2020-12-30

## (undated) RX ORDER — FENTANYL CITRATE 50 UG/ML
INJECTION, SOLUTION INTRAMUSCULAR; INTRAVENOUS
Status: DISPENSED
Start: 2021-02-08

## (undated) RX ORDER — LIDOCAINE HYDROCHLORIDE 20 MG/ML
INJECTION, SOLUTION EPIDURAL; INFILTRATION; INTRACAUDAL; PERINEURAL
Status: DISPENSED
Start: 2020-12-30

## (undated) RX ORDER — HEPARIN SODIUM 200 [USP'U]/100ML
INJECTION, SOLUTION INTRAVENOUS
Status: DISPENSED
Start: 2021-02-08

## (undated) RX ORDER — HYDROMORPHONE HYDROCHLORIDE 1 MG/ML
INJECTION, SOLUTION INTRAMUSCULAR; INTRAVENOUS; SUBCUTANEOUS
Status: DISPENSED
Start: 2020-12-30

## (undated) RX ORDER — LIDOCAINE HYDROCHLORIDE 10 MG/ML
INJECTION, SOLUTION EPIDURAL; INFILTRATION; INTRACAUDAL; PERINEURAL
Status: DISPENSED
Start: 2020-12-23

## (undated) RX ORDER — EPHEDRINE SULFATE 50 MG/ML
INJECTION, SOLUTION INTRAMUSCULAR; INTRAVENOUS; SUBCUTANEOUS
Status: DISPENSED
Start: 2020-12-30

## (undated) RX ORDER — SODIUM CHLORIDE 9 MG/ML
INJECTION, SOLUTION INTRAVENOUS
Status: DISPENSED
Start: 2021-02-08

## (undated) RX ORDER — LIDOCAINE HYDROCHLORIDE 10 MG/ML
INJECTION, SOLUTION EPIDURAL; INFILTRATION; INTRACAUDAL; PERINEURAL
Status: DISPENSED
Start: 2021-02-08

## (undated) RX ORDER — SODIUM CHLORIDE, SODIUM LACTATE, POTASSIUM CHLORIDE, CALCIUM CHLORIDE 600; 310; 30; 20 MG/100ML; MG/100ML; MG/100ML; MG/100ML
INJECTION, SOLUTION INTRAVENOUS
Status: DISPENSED
Start: 2020-12-30